# Patient Record
Sex: MALE | Race: WHITE | Employment: OTHER | URBAN - METROPOLITAN AREA
[De-identification: names, ages, dates, MRNs, and addresses within clinical notes are randomized per-mention and may not be internally consistent; named-entity substitution may affect disease eponyms.]

---

## 2017-08-19 ENCOUNTER — APPOINTMENT (OUTPATIENT)
Dept: GENERAL RADIOLOGY | Age: 42
End: 2017-08-19
Attending: PHYSICIAN ASSISTANT
Payer: COMMERCIAL

## 2017-08-19 ENCOUNTER — HOSPITAL ENCOUNTER (EMERGENCY)
Age: 42
Discharge: HOME | End: 2017-08-19
Attending: EMERGENCY MEDICINE | Admitting: EMERGENCY MEDICINE
Payer: COMMERCIAL

## 2017-08-19 VITALS
WEIGHT: 279 LBS | RESPIRATION RATE: 16 BRPM | TEMPERATURE: 98.5 F | DIASTOLIC BLOOD PRESSURE: 96 MMHG | SYSTOLIC BLOOD PRESSURE: 174 MMHG | HEART RATE: 76 BPM | OXYGEN SATURATION: 96 %

## 2017-08-19 DIAGNOSIS — S93.401A SPRAIN OF RIGHT ANKLE, UNSPECIFIED LIGAMENT, INITIAL ENCOUNTER: Primary | ICD-10-CM

## 2017-08-19 PROCEDURE — 99283 EMERGENCY DEPT VISIT LOW MDM: CPT

## 2017-08-19 PROCEDURE — L1930 AFO PLASTIC: HCPCS

## 2017-08-19 PROCEDURE — 73610 X-RAY EXAM OF ANKLE: CPT

## 2017-08-19 NOTE — DISCHARGE INSTRUCTIONS
Ankle Sprain: Care Instructions  Your Care Instructions    An ankle sprain can happen when you twist your ankle. The ligaments that support the ankle can get stretched and torn. Often the ankle is swollen and painful. Ankle sprains may take from several weeks to several months to heal. Usually, the more pain and swelling you have, the more severe your ankle sprain is and the longer it will take to heal. You can heal faster and regain strength in your ankle with good home treatment. It is very important to give your ankle time to heal completely, so that you do not easily hurt your ankle again. Follow-up care is a key part of your treatment and safety. Be sure to make and go to all appointments, and call your doctor if you are having problems. It's also a good idea to know your test results and keep a list of the medicines you take. How can you care for yourself at home? · Prop up your foot on pillows as much as possible for the next 3 days. Try to keep your ankle above the level of your heart. This will help reduce the swelling. · Follow your doctor's directions for wearing a splint or elastic bandage. Wrapping the ankle may help reduce or prevent swelling. · Your doctor may give you a splint, a brace, an air stirrup, or another form of ankle support to protect your ankle until it is healed. Wear it as directed while your ankle is healing. Do not remove it unless your doctor tells you to. After your ankle has healed, ask your doctor whether you should wear the brace when you exercise. · Put ice or cold packs on your injured ankle for 10 to 20 minutes at a time. Try to do this every 1 to 2 hours for the next 3 days (when you are awake) or until the swelling goes down. Put a thin cloth between the ice and your skin. · You may need to use crutches until you can walk without pain. If you do use crutches, try to bear some weight on your injured ankle if you can do so without pain.  This helps the ankle heal.  · Take pain medicines exactly as directed. ¨ If the doctor gave you a prescription medicine for pain, take it as prescribed. ¨ If you are not taking a prescription pain medicine, ask your doctor if you can take an over-the-counter medicine. · If you have been given ankle exercises to do at home, do them exactly as instructed. These can promote healing and help prevent lasting weakness. When should you call for help? Call your doctor now or seek immediate medical care if:  · Your pain is getting worse. · Your swelling is getting worse. · Your splint feels too tight or you are unable to loosen it. Watch closely for changes in your health, and be sure to contact your doctor if:  · You are not getting better after 1 week. Where can you learn more? Go to http://torito-kiki.info/. Enter W608 in the search box to learn more about \"Ankle Sprain: Care Instructions. \"  Current as of: March 21, 2017  Content Version: 11.3  © 5421-4449 [employer]. Care instructions adapted under license by [employer] (which disclaims liability or warranty for this information). If you have questions about a medical condition or this instruction, always ask your healthcare professional. [place] [address] any warranty or liability for your use of this information.

## 2017-08-19 NOTE — ED NOTES
NAYAN Winters has reviewed discharge instructions with the patient. The patient verbalized understanding. Pt. A&Ox4, respirations even and unlabored. VS stable as noted in flowsheet. Wheelchair assist from department with paperwork in hand.

## 2017-08-19 NOTE — ED PROVIDER NOTES
HPI Comments: Diamond Mckeon is a 37 y.o. Male who presents ambulatory to the ED with c/o right foot and ankle pain s/p falling into a hole yesterday. He states he stepped down out of his truck and landed in a hole and felt his ankle \"roll\". Pt notes secondary swelling and exacerbation of the pain with movement and pressure. Pt denies taking any medications for alleviation of symptoms. He specifically denies any fevers, chills, nausea, vomiting, chest pain, shortness of breath, headache, rash, diarrhea, sweating or weight loss. PCP: Gretchen Mata MD    Social history significant for: - Tobacco, - EtOH, - Illicit Drug Use  PMHx: none  There are no other complaints, changes, or physical findings at this time. The history is provided by the patient. No  was used. No past medical history on file. No past surgical history on file. No family history on file. Social History     Social History    Marital status: LEGALLY      Spouse name: N/A    Number of children: N/A    Years of education: N/A     Occupational History    Not on file. Social History Main Topics    Smoking status: Not on file    Smokeless tobacco: Not on file    Alcohol use Not on file    Drug use: Not on file    Sexual activity: Not on file     Other Topics Concern    Not on file     Social History Narrative         ALLERGIES: Review of patient's allergies indicates no known allergies. Review of Systems   Constitutional: Negative. Negative for activity change, appetite change, chills, fatigue, fever and unexpected weight change. HENT: Negative. Negative for congestion, hearing loss, rhinorrhea, sneezing and voice change. Eyes: Negative. Negative for pain and visual disturbance. Respiratory: Negative. Negative for apnea, cough, choking, chest tightness and shortness of breath. Cardiovascular: Negative. Negative for chest pain and palpitations.    Gastrointestinal: Negative. Negative for abdominal distention, abdominal pain, blood in stool, diarrhea, nausea and vomiting. Genitourinary: Negative. Negative for difficulty urinating, flank pain, frequency and urgency. No discharge   Musculoskeletal: Positive for myalgias (R foot ). Negative for arthralgias, back pain and neck stiffness. Skin: Negative. Negative for color change and rash. Neurological: Negative. Negative for dizziness, seizures, syncope, speech difficulty, weakness, numbness and headaches. Hematological: Negative for adenopathy. Psychiatric/Behavioral: Negative. Negative for agitation, behavioral problems, dysphoric mood and suicidal ideas. The patient is not nervous/anxious. Vitals:    08/19/17 1124   BP: (!) 174/96   Pulse: 76   Resp: 16   Temp: 98.5 °F (36.9 °C)   SpO2: 96%   Weight: 126.6 kg (279 lb)            Physical Exam   Constitutional: He is oriented to person, place, and time. He appears well-developed and well-nourished. No distress. HENT:   Head: Normocephalic and atraumatic. Mouth/Throat: Oropharynx is clear and moist. No oropharyngeal exudate. Eyes: Conjunctivae and EOM are normal. Pupils are equal, round, and reactive to light. Right eye exhibits no discharge. Left eye exhibits no discharge. Neck: Normal range of motion. Neck supple. Cardiovascular: Normal rate, regular rhythm and intact distal pulses. Exam reveals no gallop and no friction rub. No murmur heard. Pulmonary/Chest: Effort normal and breath sounds normal. No respiratory distress. He has no wheezes. He has no rales. He exhibits no tenderness. Abdominal: Soft. Bowel sounds are normal. He exhibits no distension and no mass. There is no tenderness. There is no rebound and no guarding. Musculoskeletal: Normal range of motion. He exhibits no edema. R foot- swollen and tender, decreased active/ passive   Lymphadenopathy:     He has no cervical adenopathy.    Neurological: He is alert and oriented to person, place, and time. No cranial nerve deficit. Coordination normal.   Intact neurovascularly    Skin: Skin is warm and dry. No rash noted. No erythema. Psychiatric: He has a normal mood and affect. Nursing note and vitals reviewed. MDM  Number of Diagnoses or Management Options  Diagnosis management comments: DDx: strain, sprain, fx        Amount and/or Complexity of Data Reviewed  Tests in the radiology section of CPT®: reviewed and ordered  Review and summarize past medical records: yes    Patient Progress  Patient progress: stable    ED Course       Procedures    LABORATORY TESTS:  No results found for this or any previous visit (from the past 12 hour(s)). IMAGING RESULTS:  XR ANKLE RT MIN 3 V   Final Result   EXAM:  XR ANKLE RT MIN 3 V     INDICATION:  Right ankle pain and swelling from trauma yesterday.     COMPARISON: None.     FINDINGS: Three views of the right ankle demonstrate no fracture or dislocation. There is mild osteoarthritis. There are small plantar and dorsal calcaneal  spurs.     IMPRESSION  IMPRESSION: No fracture. MEDICATIONS GIVEN:  Medications - No data to display    IMPRESSION:  1. Sprain of right ankle, unspecified ligament, initial encounter        PLAN:  1. There are no discharge medications for this patient. 2.   Follow-up Information     Follow up With Details Comments Contact Info    Bryan Broussard MD In 2 days As needed [address]  [phone]      Osbaldo Tirado. Marty Ellis MD In 2 days As needed [address] [facility]  [address]  [facility].  [phone]          Return to ED if worse     Discharge Note:  12:37 PM  The pt is ready for discharge. The pt's signs, symptoms, diagnosis, and discharge instructions have been discussed and pt has conveyed their understanding. The pt is to follow up as recommended or return to ER should their symptoms worsen.  Plan has been discussed and pt is in agreement. This note is prepared by Guero Arreaga, acting as a Scribe for [facility] [employer]. [facility] [employer]: The scribe's documentation has been prepared under my direction and personally reviewed by me in its entirety. I confirm that the notes above accurately reflects all work, treatment, procedures, and medical decision making performed by me.

## 2018-08-28 ENCOUNTER — APPOINTMENT (OUTPATIENT)
Dept: GENERAL RADIOLOGY | Age: 43
End: 2018-08-28
Attending: EMERGENCY MEDICINE
Payer: SELF-PAY

## 2018-08-28 ENCOUNTER — HOSPITAL ENCOUNTER (EMERGENCY)
Age: 43
Discharge: HOME | End: 2018-08-28
Attending: EMERGENCY MEDICINE | Admitting: EMERGENCY MEDICINE
Payer: SELF-PAY

## 2018-08-28 VITALS
BODY MASS INDEX: 38.47 KG/M2 | DIASTOLIC BLOOD PRESSURE: 60 MMHG | RESPIRATION RATE: 19 BRPM | HEIGHT: 70 IN | OXYGEN SATURATION: 98 % | HEART RATE: 85 BPM | WEIGHT: 268.74 LBS | TEMPERATURE: 98.7 F | SYSTOLIC BLOOD PRESSURE: 112 MMHG

## 2018-08-28 DIAGNOSIS — R07.89 ATYPICAL CHEST PAIN: Primary | ICD-10-CM

## 2018-08-28 LAB
ALBUMIN SERPL-MCNC: 4.3 G/DL (ref 3.5–5)
ALBUMIN/GLOB SERPL: 1 {RATIO} (ref 1.1–2.2)
ALP SERPL-CCNC: 102 U/L (ref 45–117)
ALT SERPL-CCNC: 54 U/L (ref 12–78)
ANION GAP SERPL CALC-SCNC: 8 MMOL/L (ref 5–15)
AST SERPL-CCNC: 32 U/L (ref 15–37)
BASOPHILS # BLD: 0.1 K/UL (ref 0–0.1)
BASOPHILS NFR BLD: 1 % (ref 0–1)
BILIRUB SERPL-MCNC: 0.9 MG/DL (ref 0.2–1)
BUN SERPL-MCNC: 14 MG/DL (ref 6–20)
BUN/CREAT SERPL: 11 (ref 12–20)
CALCIUM SERPL-MCNC: 9.4 MG/DL (ref 8.5–10.1)
CHLORIDE SERPL-SCNC: 100 MMOL/L (ref 97–108)
CO2 SERPL-SCNC: 28 MMOL/L (ref 21–32)
CREAT SERPL-MCNC: 1.25 MG/DL (ref 0.7–1.3)
DIFFERENTIAL METHOD BLD: ABNORMAL
EOSINOPHIL # BLD: 0.2 K/UL (ref 0–0.4)
EOSINOPHIL NFR BLD: 2 % (ref 0–7)
ERYTHROCYTE [DISTWIDTH] IN BLOOD BY AUTOMATED COUNT: 13.2 % (ref 11.5–14.5)
GLOBULIN SER CALC-MCNC: 4.2 G/DL (ref 2–4)
GLUCOSE SERPL-MCNC: 222 MG/DL (ref 65–100)
HCT VFR BLD AUTO: 45.8 % (ref 36.6–50.3)
HGB BLD-MCNC: 16 G/DL (ref 12.1–17)
IMM GRANULOCYTES # BLD: 0.1 K/UL (ref 0–0.04)
IMM GRANULOCYTES NFR BLD AUTO: 1 % (ref 0–0.5)
INR PPP: 1 (ref 0.9–1.1)
LYMPHOCYTES # BLD: 3.7 K/UL (ref 0.8–3.5)
LYMPHOCYTES NFR BLD: 30 % (ref 12–49)
MAGNESIUM SERPL-MCNC: 2.2 MG/DL (ref 1.6–2.4)
MCH RBC QN AUTO: 32.3 PG (ref 26–34)
MCHC RBC AUTO-ENTMCNC: 34.9 G/DL (ref 30–36.5)
MCV RBC AUTO: 92.5 FL (ref 80–99)
MONOCYTES # BLD: 1.1 K/UL (ref 0–1)
MONOCYTES NFR BLD: 9 % (ref 5–13)
NEUTS SEG # BLD: 7.3 K/UL (ref 1.8–8)
NEUTS SEG NFR BLD: 59 % (ref 32–75)
NRBC # BLD: 0 K/UL (ref 0–0.01)
NRBC BLD-RTO: 0 PER 100 WBC
PLATELET # BLD AUTO: 356 K/UL (ref 150–400)
PMV BLD AUTO: 10.6 FL (ref 8.9–12.9)
POTASSIUM SERPL-SCNC: 4.2 MMOL/L (ref 3.5–5.1)
PROT SERPL-MCNC: 8.5 G/DL (ref 6.4–8.2)
PROTHROMBIN TIME: 9.9 SEC (ref 9–11.1)
RBC # BLD AUTO: 4.95 M/UL (ref 4.1–5.7)
SODIUM SERPL-SCNC: 136 MMOL/L (ref 136–145)
TROPONIN I SERPL-MCNC: <0.05 NG/ML
WBC # BLD AUTO: 12.5 K/UL (ref 4.1–11.1)

## 2018-08-28 PROCEDURE — 99284 EMERGENCY DEPT VISIT MOD MDM: CPT

## 2018-08-28 PROCEDURE — 36415 COLL VENOUS BLD VENIPUNCTURE: CPT | Performed by: EMERGENCY MEDICINE

## 2018-08-28 PROCEDURE — 83735 ASSAY OF MAGNESIUM: CPT | Performed by: EMERGENCY MEDICINE

## 2018-08-28 PROCEDURE — 80053 COMPREHEN METABOLIC PANEL: CPT | Performed by: EMERGENCY MEDICINE

## 2018-08-28 PROCEDURE — 85610 PROTHROMBIN TIME: CPT | Performed by: EMERGENCY MEDICINE

## 2018-08-28 PROCEDURE — 93005 ELECTROCARDIOGRAM TRACING: CPT

## 2018-08-28 PROCEDURE — 84484 ASSAY OF TROPONIN QUANT: CPT | Performed by: EMERGENCY MEDICINE

## 2018-08-28 PROCEDURE — 85025 COMPLETE CBC W/AUTO DIFF WBC: CPT | Performed by: EMERGENCY MEDICINE

## 2018-08-28 PROCEDURE — 74011250637 HC RX REV CODE- 250/637: Performed by: EMERGENCY MEDICINE

## 2018-08-28 PROCEDURE — 71045 X-RAY EXAM CHEST 1 VIEW: CPT

## 2018-08-28 RX ORDER — NAPROXEN 500 MG/1
500 TABLET ORAL
Qty: 20 TAB | Refills: 0 | Status: SHIPPED | OUTPATIENT
Start: 2018-08-28

## 2018-08-28 RX ORDER — ASPIRIN 325 MG
325 TABLET ORAL
Status: COMPLETED | OUTPATIENT
Start: 2018-08-28 | End: 2018-08-28

## 2018-08-28 RX ORDER — ASPIRIN 325 MG
325 TABLET ORAL DAILY
Status: DISCONTINUED | OUTPATIENT
Start: 2018-08-29 | End: 2018-08-28

## 2018-08-28 RX ADMIN — ASPIRIN 325 MG ORAL TABLET 325 MG: 325 PILL ORAL at 19:37

## 2018-08-28 NOTE — ED NOTES
Patient complain of mid sternal chest pain at this time. MD Piedad Siegel made aware. Repeat EKG to be done at this time

## 2018-08-28 NOTE — ED PROVIDER NOTES
EMERGENCY DEPARTMENT HISTORY AND PHYSICAL EXAM 
 
 
Date: 2018 Patient Name: Anthony Schilling History of Presenting Illness Chief Complaint Patient presents with  Chest Pain  
  pt arrived to triage ambulatory with complaint of mid sternal chest pressure starting yesterday evening, no cardiac hx, diaphoretic, deines fever or chills  Fatigue History Provided By: Patient HPI: Anthony Schilling, 40 y.o. male with no known PMHx, presents ambulatory to the ED with cc of intermittent substernal CP since yesterday evening. Patient describes pain as \"a cinderblock sitting on my chest\" that lasts for 5-6 minutes at a time and radiates to his back. He also reports sweating and SOB last night and that today at work he was sweating with no exertion. He states he does not know of any factors that cause onset of his pain. He specifically denies any fevers, chills, nausea, vomiting, headache, rash, diarrhea, sweating or weight loss. He states he has not taken any medication today for his sxs. There are no other complaints, changes, or physical findings at this time. PCP: None Past History Past Medical History: No past medical history on file. Past Surgical History: No past surgical history on file. Family History: No family history on file. Social History: 
Social History Substance Use Topics  Smoking status: Not on file  Smokeless tobacco: Not on file  Alcohol use Not on file Allergies: 
No Known Allergies Review of Systems Review of Systems Constitutional: Positive for diaphoresis. Negative for activity change, appetite change, chills, fatigue, fever and unexpected weight change. HENT: Negative. Negative for congestion, hearing loss, rhinorrhea, sneezing and voice change. Eyes: Negative. Negative for pain and visual disturbance. Respiratory: Positive for shortness of breath.  Negative for apnea, cough, choking and chest tightness. Cardiovascular: Positive for chest pain. Negative for palpitations. Gastrointestinal: Negative. Negative for abdominal distention, abdominal pain, blood in stool, diarrhea, nausea and vomiting. Genitourinary: Negative. Negative for difficulty urinating, flank pain, frequency and urgency. No discharge Musculoskeletal: Negative. Negative for arthralgias, back pain, myalgias and neck stiffness. Skin: Negative. Negative for color change and rash. Neurological: Negative. Negative for dizziness, seizures, syncope, speech difficulty, weakness, numbness and headaches. Hematological: Negative for adenopathy. Psychiatric/Behavioral: Negative. Negative for agitation, behavioral problems, dysphoric mood and suicidal ideas. The patient is not nervous/anxious. Physical Exam  
Physical Exam  
Constitutional: He is oriented to person, place, and time. He appears well-developed and well-nourished. No distress. HENT:  
Head: Normocephalic and atraumatic. Mouth/Throat: Oropharynx is clear and moist. No oropharyngeal exudate. Eyes: Conjunctivae and EOM are normal. Pupils are equal, round, and reactive to light. Right eye exhibits no discharge. Left eye exhibits no discharge. Neck: Normal range of motion. Neck supple. Cardiovascular: Normal rate, regular rhythm and intact distal pulses. Exam reveals no gallop and no friction rub. No murmur heard. Pulmonary/Chest: Effort normal and breath sounds normal. No respiratory distress. He has no wheezes. He has no rales. He exhibits no tenderness. Abdominal: Soft. Bowel sounds are normal. He exhibits no distension and no mass. There is no tenderness. There is no rebound and no guarding. Musculoskeletal: Normal range of motion. He exhibits no edema. Lymphadenopathy:  
  He has no cervical adenopathy. Neurological: He is alert and oriented to person, place, and time.  No cranial nerve deficit. Coordination normal.  
Skin: Skin is warm and dry. No rash noted. No erythema. Psychiatric: He has a normal mood and affect. Nursing note and vitals reviewed. Diagnostic Study Results Labs - Recent Results (from the past 12 hour(s)) EKG, 12 LEAD, INITIAL Collection Time: 08/28/18  6:05 PM  
Result Value Ref Range Ventricular Rate 85 BPM  
 Atrial Rate 85 BPM  
 P-R Interval 152 ms QRS Duration 90 ms Q-T Interval 358 ms QTC Calculation (Bezet) 426 ms Calculated P Axis 53 degrees Calculated R Axis 17 degrees Calculated T Axis 36 degrees Diagnosis Normal sinus rhythm Normal ECG No previous ECGs available CBC WITH AUTOMATED DIFF Collection Time: 08/28/18  7:21 PM  
Result Value Ref Range WBC 12.5 (H) 4.1 - 11.1 K/uL  
 RBC 4.95 4.10 - 5.70 M/uL  
 HGB 16.0 12.1 - 17.0 g/dL HCT 45.8 36.6 - 50.3 % MCV 92.5 80.0 - 99.0 FL  
 MCH 32.3 26.0 - 34.0 PG  
 MCHC 34.9 30.0 - 36.5 g/dL  
 RDW 13.2 11.5 - 14.5 % PLATELET 960 161 - 002 K/uL MPV 10.6 8.9 - 12.9 FL  
 NRBC 0.0 0  WBC ABSOLUTE NRBC 0.00 0.00 - 0.01 K/uL NEUTROPHILS 59 32 - 75 % LYMPHOCYTES 30 12 - 49 % MONOCYTES 9 5 - 13 % EOSINOPHILS 2 0 - 7 % BASOPHILS 1 0 - 1 % IMMATURE GRANULOCYTES 1 (H) 0.0 - 0.5 % ABS. NEUTROPHILS 7.3 1.8 - 8.0 K/UL  
 ABS. LYMPHOCYTES 3.7 (H) 0.8 - 3.5 K/UL  
 ABS. MONOCYTES 1.1 (H) 0.0 - 1.0 K/UL  
 ABS. EOSINOPHILS 0.2 0.0 - 0.4 K/UL  
 ABS. BASOPHILS 0.1 0.0 - 0.1 K/UL  
 ABS. IMM. GRANS. 0.1 (H) 0.00 - 0.04 K/UL  
 DF AUTOMATED METABOLIC PANEL, COMPREHENSIVE Collection Time: 08/28/18  7:21 PM  
Result Value Ref Range Sodium 136 136 - 145 mmol/L Potassium 4.2 3.5 - 5.1 mmol/L Chloride 100 97 - 108 mmol/L  
 CO2 28 21 - 32 mmol/L Anion gap 8 5 - 15 mmol/L Glucose 222 (H) 65 - 100 mg/dL BUN 14 6 - 20 MG/DL  Creatinine 1.25 0.70 - 1.30 MG/DL  
 BUN/Creatinine ratio 11 (L) 12 - 20    
 GFR est AA >60 >60 ml/min/1.73m2 GFR est non-AA >60 >60 ml/min/1.73m2 Calcium 9.4 8.5 - 10.1 MG/DL Bilirubin, total 0.9 0.2 - 1.0 MG/DL  
 ALT (SGPT) 54 12 - 78 U/L  
 AST (SGOT) 32 15 - 37 U/L Alk. phosphatase 102 45 - 117 U/L Protein, total 8.5 (H) 6.4 - 8.2 g/dL Albumin 4.3 3.5 - 5.0 g/dL Globulin 4.2 (H) 2.0 - 4.0 g/dL A-G Ratio 1.0 (L) 1.1 - 2.2 MAGNESIUM Collection Time: 08/28/18  7:21 PM  
Result Value Ref Range Magnesium 2.2 1.6 - 2.4 mg/dL TROPONIN I Collection Time: 08/28/18  7:21 PM  
Result Value Ref Range Troponin-I, Qt. <0.05 <0.05 ng/mL EKG, 12 LEAD, INITIAL Collection Time: 08/28/18  7:32 PM  
Result Value Ref Range Ventricular Rate 80 BPM  
 Atrial Rate 80 BPM  
 P-R Interval 168 ms QRS Duration 90 ms Q-T Interval 374 ms QTC Calculation (Bezet) 431 ms Calculated P Axis 62 degrees Calculated R Axis 15 degrees Calculated T Axis 26 degrees Diagnosis Normal sinus rhythm Normal ECG When compared with ECG of 28-AUG-2018 18:05, 
MANUAL COMPARISON REQUIRED, DATA IS UNCONFIRMED 
  
EKG, 12 LEAD, INITIAL Collection Time: 08/28/18  7:42 PM  
Result Value Ref Range Ventricular Rate 72 BPM  
 Atrial Rate 72 BPM  
 P-R Interval 166 ms  
 QRS Duration 90 ms Q-T Interval 382 ms QTC Calculation (Bezet) 418 ms Calculated P Axis 43 degrees Calculated R Axis 15 degrees Calculated T Axis 19 degrees Diagnosis Normal sinus rhythm Normal ECG When compared with ECG of 28-AUG-2018 19:32, 
MANUAL COMPARISON REQUIRED, DATA IS UNCONFIRMED PROTHROMBIN TIME + INR Collection Time: 08/28/18  8:14 PM  
Result Value Ref Range INR 1.0 0.9 - 1.1 Prothrombin time 9.9 9.0 - 11.1 sec Radiologic Studies -  
XR CHEST PORT Final Result CT Results  (Last 48 hours) None CXR Results  (Last 48 hours) 08/28/18 1922  XR CHEST PORT Final result Impression:  Impression: 1. No acute disease Narrative:  INDICATION:  Chest Pain Exam: Portable chest 1914. Comparison: None. Findings: Cardiomediastinal silhouette is within normal limits. Pulmonary  
vasculature is not engorged. There are no focal parenchymal opacities,  
effusions, or pneumothorax. Medical Decision Making I am the first provider for this patient. I reviewed the vital signs, available nursing notes, past medical history, past surgical history, family history and social history. Vital Signs-Reviewed the patient's vital signs. Patient Vitals for the past 12 hrs: 
 Temp Pulse Resp BP SpO2  
08/28/18 2003 - 77 22 (!) 145/93 98 % 08/28/18 1915 - 77 20 (!) 143/94 98 % 08/28/18 1804 98.7 °F (37.1 °C) 91 16 (!) 143/98 98 % Pulse Oximetry Analysis - 98% on RA Cardiac Monitor:  
Rate: 91 bpm 
Rhythm: Normal Sinus Rhythm EKG interpretation: (Preliminary) 1942 Rhythm: normal sinus rhythm; and regular . Rate (approx.): 72; Axis: normal; IA interval: normal; QRS interval: normal ; ST/T wave: normal; Other findings: normal. 
Written by Rubi Alatorre ED Scribe, as dictated by Daiana Gould. Deo Stein MD. Records Reviewed: Nursing Notes and Old Medical Records Provider Notes (Medical Decision Making): DDx: ACS, arrhythmia, musculoskeletal  
 
ED Course:  
Initial assessment performed. The patients presenting problems have been discussed, and they are in agreement with the care plan formulated and outlined with them. I have encouraged them to ask questions as they arise throughout their visit. 9:14 PM 
The patient states that their symptoms have resolved and they feel much better. There are no other new complaints at this time. His questions have been answered. We are awaiting final results and those will be reviewed with them when they become available.  
The patient states his pain was worse when he sits up and stretches his chest. 
 
 
 Critical Care Time:  
0 minutes Disposition: 
9:15 PM 
Marcella Bah III's  results have been reviewed with him. He has been counseled regarding his diagnosis. He verbally conveys understanding and agreement of the signs, symptoms, diagnosis, treatment and prognosis and additionally agrees to follow up as recommended with Dr. None in 24 - 48 hours. He also agrees with the care-plan and conveys that all of his questions have been answered. I have also put together some discharge instructions for him that include: 1) educational information regarding their diagnosis, 2) how to care for their diagnosis at home, as well a 3) list of reasons why they would want to return to the ED prior to their follow-up appointment, should their condition change. PLAN: 
1. Current Discharge Medication List  
  
START taking these medications Details  
naproxen (NAPROSYN) 500 mg tablet Take 1 Tab by mouth every twelve (12) hours as needed for Pain. Qty: 20 Tab, Refills: 0  
  
  
 
2. Follow-up Information Follow up With Details Comments Contact Info Aubrie Ibanez MD Call in 2 days  [address] [facility] 
[phone] Return to ED if worse Diagnosis Clinical Impression: 1. Atypical chest pain Attestations: This note is prepared by Judie Galvan, acting as Scribe for [employer] Piyush Mooney, [address] Piyush Mooney MD: The scribe's documentation has been prepared under my direction and personally reviewed by me in its entirety. I confirm that the note above accurately reflects all work, treatment, procedures, and medical decision making performed by me.

## 2018-08-29 LAB
ATRIAL RATE: 72 BPM
ATRIAL RATE: 80 BPM
ATRIAL RATE: 85 BPM
CALCULATED P AXIS, ECG09: 43 DEGREES
CALCULATED P AXIS, ECG09: 53 DEGREES
CALCULATED P AXIS, ECG09: 62 DEGREES
CALCULATED R AXIS, ECG10: 15 DEGREES
CALCULATED R AXIS, ECG10: 15 DEGREES
CALCULATED R AXIS, ECG10: 17 DEGREES
CALCULATED T AXIS, ECG11: 19 DEGREES
CALCULATED T AXIS, ECG11: 26 DEGREES
CALCULATED T AXIS, ECG11: 36 DEGREES
DIAGNOSIS, 93000: NORMAL
P-R INTERVAL, ECG05: 152 MS
P-R INTERVAL, ECG05: 166 MS
P-R INTERVAL, ECG05: 168 MS
Q-T INTERVAL, ECG07: 358 MS
Q-T INTERVAL, ECG07: 374 MS
Q-T INTERVAL, ECG07: 382 MS
QRS DURATION, ECG06: 90 MS
QTC CALCULATION (BEZET), ECG08: 418 MS
QTC CALCULATION (BEZET), ECG08: 426 MS
QTC CALCULATION (BEZET), ECG08: 431 MS
VENTRICULAR RATE, ECG03: 72 BPM
VENTRICULAR RATE, ECG03: 80 BPM
VENTRICULAR RATE, ECG03: 85 BPM

## 2018-08-29 NOTE — DISCHARGE INSTRUCTIONS
Musculoskeletal Chest Pain: Care Instructions  Your Care Instructions    Chest pain is not always a sign that something is wrong with your heart or that you have another serious problem. The doctor thinks your chest pain is caused by strained muscles or ligaments, inflamed chest cartilage, or another problem in your chest, rather than by your heart. You may need more tests to find the cause of your chest pain. Follow-up care is a key part of your treatment and safety. Be sure to make and go to all appointments, and call your doctor if you are having problems. It's also a good idea to know your test results and keep a list of the medicines you take. How can you care for yourself at home? · Take pain medicines exactly as directed. ¨ If the doctor gave you a prescription medicine for pain, take it as prescribed. ¨ If you are not taking a prescription pain medicine, ask your doctor if you can take an over-the-counter medicine. · Rest and protect the sore area. · Stop, change, or take a break from any activity that may be causing your pain or soreness. · Put ice or a cold pack on the sore area for 10 to 20 minutes at a time. Try to do this every 1 to 2 hours for the next 3 days (when you are awake) or until the swelling goes down. Put a thin cloth between the ice and your skin. · After 2 or 3 days, apply a heating pad set on low or a warm cloth to the area that hurts. Some doctors suggest that you go back and forth between hot and cold. · Do not wrap or tape your ribs for support. This may cause you to take smaller breaths, which could increase your risk of lung problems. · Mentholated creams such as Bengay or Icy Hot may soothe sore muscles. Follow the instructions on the package. · Follow your doctor's instructions for exercising. · Gentle stretching and massage may help you get better faster. Stretch slowly to the point just before pain begins, and hold the stretch for at least 15 to 30 seconds.  Do this 3 or 4 times a day. Stretch just after you have applied heat. · As your pain gets better, slowly return to your normal activities. Any increased pain may be a sign that you need to rest a while longer. When should you call for help? Call 911 anytime you think you may need emergency care. For example, call if:    · You have chest pain or pressure. This may occur with:  ¨ Sweating. ¨ Shortness of breath. ¨ Nausea or vomiting. ¨ Pain that spreads from the chest to the neck, jaw, or one or both shoulders or arms. ¨ Dizziness or lightheadedness. ¨ A fast or uneven pulse. After calling 911, chew 1 adult-strength aspirin. Wait for an ambulance. Do not try to drive yourself.     · You have sudden chest pain and shortness of breath, or you cough up blood.    Call your doctor now or seek immediate medical care if:    · You have any trouble breathing.     · Your chest pain gets worse.     · Your chest pain occurs consistently with exercise and is relieved by rest.    Watch closely for changes in your health, and be sure to contact your doctor if:    · Your chest pain does not get better after 1 week. Where can you learn more? Go to http://torito-kiki.info/. Enter V293 in the search box to learn more about \"Musculoskeletal Chest Pain: Care Instructions. \"  Current as of: November 20, 2017  Content Version: 11.7  © 7563-8955 [employer]. Care instructions adapted under license by [employer] (which disclaims liability or warranty for this information). If you have questions about a medical condition or this instruction, always ask your healthcare professional. [place] [address] any warranty or liability for your use of this information.

## 2018-08-29 NOTE — ED NOTES
MD Geovanna Valladares reviewed discharge instructions with the patient. Patient ambulatory out of treatment area with steady gait

## 2018-09-06 ENCOUNTER — OFFICE VISIT (OUTPATIENT)
Dept: CARDIOLOGY CLINIC | Age: 43
End: 2018-09-06

## 2018-09-06 VITALS
SYSTOLIC BLOOD PRESSURE: 118 MMHG | HEART RATE: 79 BPM | RESPIRATION RATE: 18 BRPM | DIASTOLIC BLOOD PRESSURE: 80 MMHG | BODY MASS INDEX: 39.61 KG/M2 | WEIGHT: 276.7 LBS | HEIGHT: 70 IN | OXYGEN SATURATION: 96 %

## 2018-09-06 DIAGNOSIS — I20.9 ANGINA PECTORIS (HCC): Primary | ICD-10-CM

## 2018-09-06 DIAGNOSIS — F17.201 MODERATE TOBACCO USE DISORDER, IN SUSTAINED REMISSION: ICD-10-CM

## 2018-09-06 DIAGNOSIS — R07.9 CHEST PAIN, UNSPECIFIED TYPE: ICD-10-CM

## 2018-09-06 PROBLEM — E66.01 SEVERE OBESITY (BMI 35.0-39.9): Status: ACTIVE | Noted: 2018-09-06

## 2018-09-06 RX ORDER — METOPROLOL TARTRATE 25 MG/1
25 TABLET, FILM COATED ORAL 2 TIMES DAILY
Qty: 60 TAB | Refills: 6 | Status: SHIPPED | OUTPATIENT
Start: 2018-09-06 | End: 2018-09-06 | Stop reason: SDUPTHER

## 2018-09-06 RX ORDER — NITROGLYCERIN 0.4 MG/1
0.4 TABLET SUBLINGUAL
Qty: 25 TAB | Refills: 1 | Status: SHIPPED | OUTPATIENT
Start: 2018-09-06 | End: 2018-09-06 | Stop reason: SDUPTHER

## 2018-09-06 RX ORDER — ISOSORBIDE MONONITRATE 30 MG/1
30 TABLET, EXTENDED RELEASE ORAL DAILY
Qty: 90 TAB | Refills: 1 | Status: SHIPPED | OUTPATIENT
Start: 2018-09-06 | End: 2018-09-08 | Stop reason: SINTOL

## 2018-09-06 RX ORDER — ASPIRIN 81 MG/1
81 TABLET ORAL DAILY
Qty: 30 TAB | Refills: 11
Start: 2018-09-06

## 2018-09-06 RX ORDER — METOPROLOL TARTRATE 25 MG/1
25 TABLET, FILM COATED ORAL 2 TIMES DAILY
Qty: 180 TAB | Refills: 3 | Status: SHIPPED | OUTPATIENT
Start: 2018-09-06 | End: 2019-04-23 | Stop reason: SDUPTHER

## 2018-09-06 RX ORDER — RANITIDINE 150 MG/1
150 TABLET, FILM COATED ORAL 2 TIMES DAILY
Qty: 180 TAB | Refills: 0 | Status: ON HOLD | OUTPATIENT
Start: 2018-09-06 | End: 2018-09-28

## 2018-09-06 RX ORDER — NITROGLYCERIN 0.4 MG/1
0.4 TABLET SUBLINGUAL
Qty: 25 TAB | Refills: 1 | Status: SHIPPED | OUTPATIENT
Start: 2018-09-06

## 2018-09-06 RX ORDER — RANITIDINE 150 MG/1
150 TABLET, FILM COATED ORAL 2 TIMES DAILY
Qty: 180 TAB | Refills: 0
Start: 2018-09-06 | End: 2018-09-06 | Stop reason: SDUPTHER

## 2018-09-06 RX ORDER — ISOSORBIDE MONONITRATE 30 MG/1
30 TABLET, EXTENDED RELEASE ORAL DAILY
Qty: 90 TAB | Refills: 1 | Status: SHIPPED | OUTPATIENT
Start: 2018-09-06 | End: 2018-09-06 | Stop reason: SDUPTHER

## 2018-09-06 NOTE — PROGRESS NOTES
1. Have you been to the ER, urgent care clinic since your last visit? Hospitalized since your last visit? Seen in ER on 8/28/18 for chest pain. 2. Have you seen or consulted any other health care providers outside of the [facility] since your last visit? Include any pap smears or colon screening. No. 
 
 
Chief Complaint Patient presents with  New Patient  
  chest pain in middle of chest on and off that can radiate to his back,sob

## 2018-09-06 NOTE — MR AVS SNAPSHOT
[address] 
[phone] Patient: Missy Baptiste MRN: [id number] :1975 Visit Information Date & Time Provider Department Dept. Phone Encounter #  
 2018 10:30 AM Ridge Freeman, [address] [facility] [phone] Follow-up Instructions Return in about 2 weeks (around 2018). Follow-up and Disposition History Your Appointments 2018  1:00 PM  
NUCLEAR MEDICINE with NUCLEAR, [facility] [address] [facility]) Appt Note: per Dr Radha Arroyo, 5'10, 276, ECST   [address] [address] [facility] [address]  
[phone] [address] [facility] [address] [id number]  
  
    
 2018  3:30 PM  
ECHO CARDIOGRAMS 2D with ECHO, [facility] [address] [place] [address]) Appt Note: per Dr. Keyona Anderson [id number] [facility] [address]  
[phone] [id number] [facility] [address] Upcoming Health Maintenance Date Due DTaP/Tdap/Td series (1 - Tdap) 11/10/1997 Influenza Age 5 to Adult 2018 Allergies as of 2018  Review Complete On: 2018 By: Ridge Freeman MD  
 No Known Allergies Current Immunizations  Never Reviewed No immunizations on file. Not reviewed this visit You Were Diagnosed With   
  
 Codes Comments Angina pectoris ([facility] [address]    -  Primary ICD-10-CM: I20.9 ICD-9-CM: 413.9 Chest pain, unspecified type     ICD-10-CM: R07.9 ICD-9-CM: 786.50 Moderate tobacco use disorder, in sustained remission     ICD-10-CM: F17.201 ICD-9-CM: 305.1 BMI 39.0-39.9,adult     ICD-10-CM: F12.56 ICD-9-CM: V85.39 Vitals BP Pulse Resp Height(growth percentile) Weight(growth percentile) SpO2  
 118/80 (BP 1 Location: Right arm, BP Patient Position: Sitting) 79 18 5' 10\" (1.778 m) 276 lb 11.2 oz (125.5 kg) 96% BMI Smoking Status 39.7 kg/m2 Former Smoker Vitals History BMI and BSA Data Body Mass Index Body Surface Area 39.7 kg/m 2 2.49 m 2 Preferred Pharm[id number] [place] [place] - [address] [phone] Your Updated Medication List  
  
   
This list is accurate as of 18 11:21 AM.  Always use your most recent med list.  
  
  
  
  
 aspirin delayed-release 81 mg tablet Take 1 Tab by mouth daily. isosorbide mononitrate ER 30 mg tablet Commonly known as:  IMDUR Take 1 Tab by mouth daily. To help prevent chest pain- call MD if significant headache  
  
 metoprolol tartrate 25 mg tablet Commonly known as:  LOPRESSOR Take 1 Tab by mouth two (2) times a day. naproxen 500 mg tablet Commonly known as:  NAPROSYN Take 1 Tab by mouth every twelve (12) hours as needed for Pain. nitroglycerin 0.4 mg SL tablet Commonly known as:  NITROSTAT  
1 Tab by SubLINGual route every five (5) minutes as needed (call 911 if not relieved by 3). raNITIdine 150 mg tablet Commonly known as:  ZANTAC Take 1 Tab by mouth two (2) times a day. Prescriptions Sent to Pharmacy Refills  
 metoprolol tartrate (LOPRESSOR) 25 mg tablet 3 Sig: Take 1 Tab by mouth two (2) times a day. Class: Normal  
 Pharmacy: [facility] [address] [place] [address] [address] Ph #: [phone] Route: Oral  
 isosorbide mononitrate ER (IMDUR) 30 mg tablet 1 Sig: Take 1 Tab by mouth daily. To help prevent chest pain- call MD if significant headache Class: Normal  
 Pharmacy: [facility]/pharmacy [address] [place] [address] S[address] Ph #: [phone] Route: Oral  
 nitroglycerin (NITROSTAT) 0.4 mg SL tablet 1  Si Tab by SubLINGual route every five (5) minutes as needed (call 911 if not relieved by 3). Class: Normal  
 Pharmacy: [facility]acy [address] [employer] [address] Ph #: [phone] Route: SubLINGual  
 raNITIdine (ZANTAC) 150 mg tablet 0 Sig: Take 1 Tab by mouth two (2) times a day. Class: Normal  
 Pharmacy: [facility]/pharmacy [address] [employer] [address] Ph #: [phone] Route: Oral  
  
We Performed the Following AMB POC EKG ROUTINE W/ 12 LEADS, INTER & REP [75925 CPT(R)] Follow-up Instructions Return in about 2 weeks (around 9/20/2018). To-Do List   
 09/06/2018 ECHO:  2D ECHO COMPLETE ADULT (TTE) W OR WO CONTR   
  
 09/06/2018 ECG:  STRESS TEST CARDIOLITE Introducing [facility]! [employer] introduces AppSheet patient portal. Now you can access parts of your medical record, email your doctor's office, and request medication refills online. 1. In your internet browser, go to [email or web address] 2. Click on the First Time User? Click Here link in the Sign In box. You will see the New Member Sign Up page. 3. Enter your AppSheet Access Code exactly as it appears below. You will not need to use this code after youve completed the sign-up process. If you do not sign up before the expiration date, you must request a new code. · AppSheet Access Code: [email or web address] Expires: 11/26/2018  6:04 PM 
 
4. Enter the last four digits of your Social Security Number (xxxx) and Date of Birth (mm/dd/yyyy) as indicated and click Submit. You will be taken to the next sign-up page. 5. Create a AppSheet ID. This will be your AppSheet login ID and cannot be changed, so think of one that is secure and easy to remember. 6. Create a AppSheet password. You can change your password at any time. 7. Enter your Password Reset Question and Answer. This can be used at a later time if you forget your password. 8. Enter your e-mail address. You will receive e-mail notification when new information is available in [address] 9. Click Sign Up. You can now view and download portions of your medical record. 10. Click the Download Summary menu link to download a portable copy of your medical information. If you have questions, please visit the Frequently Asked Questions section of the El[employer]r website. Remember, Elixr is NOT to be used for urgent needs. For medical emergencies, dial 911. Now available from your iPhone and Android! Please provide this summary of care documentation to your next provider. Your primary care clinician is listed as NONE. If you have any questions after today's visit, please call [phone].

## 2018-09-06 NOTE — PROGRESS NOTES
[address] [place] [address] [address] [facility]  [phone] Subjective:  
  
Jenna Mon is a 40 y.o. male is here for a new patient visit for chest pain. He was recently seen in the emergency department with the following symptoms: 
 
\"46 y.o. male with no known PMHx, presents ambulatory to the ED with cc of intermittent substernal CP since yesterday evening. Patient describes pain as \"a cinderblock sitting on my chest\" that lasts for 5-6 minutes at a time and radiates to his back. He also reports sweating and SOB last night and that today at work he was sweating with no exertion. He states he does not know of any factors that cause onset of his pain. \" The patient denies shortness of breath, orthopnea, PND, LE edema, palpitations, syncope, or presyncope. Patient Active Problem List  
 Diagnosis Date Noted  Chest pain 09/06/2018  Moderate tobacco use disorder, in sustained remission 09/06/2018  Severe obesity (BMI 35.0-39.9) ([facility] [address] 09/06/2018 None No past medical history on file. No past surgical history on file. No Known Allergies Family History Problem Relation Age of Onset  Heart Disease Father 48 Social History Social History  Marital status: SINGLE Spouse name: N/A  
 Number of children: N/A  
 Years of education: N/A Occupational History  Not on file. Social History Main Topics  Smoking status: Former Smoker Types: Cigarettes Quit date: 9/6/2014  Smokeless tobacco: Current User Types: Chew  Alcohol use Yes Comment: occasionally  Drug use: No  
 Sexual activity: Not on file Other Topics Concern  Not on file Social History Narrative  No narrative on file Current Outpatient Prescriptions Medication Sig  
 aspirin delayed-release 81 mg tablet Take 1 Tab by mouth daily.  metoprolol tartrate (LOPRESSOR) 25 mg tablet Take 1 Tab by mouth two (2) times a day.  isosorbide mononitrate ER (IMDUR) 30 mg tablet Take 1 Tab by mouth daily. To help prevent chest pain- call MD if significant headache  nitroglycerin (NITROSTAT) 0.4 mg SL tablet 1 Tab by SubLINGual route every five (5) minutes as needed (call 911 if not relieved by 3).  raNITIdine (ZANTAC) 150 mg tablet Take 1 Tab by mouth two (2) times a day.  naproxen (NAPROSYN) 500 mg tablet Take 1 Tab by mouth every twelve (12) hours as needed for Pain. No current facility-administered medications for this visit. Review of Symptoms: 
11 systems reviewed, negative other than as stated in the HPI Physical ExamPhysical Exam:   
Vitals:  
 09/06/18 1037 09/06/18 1050 BP: 114/72 118/80 Pulse: 79 Resp: 18 SpO2: 96% Weight: 276 lb 11.2 oz (125.5 kg) Height: 5' 10\" (1.778 m) Body mass index is 39.7 kg/(m^2). General PE Gen:  NAD Mental Status - Alert. General Appearance - Not in acute distress. Chest and Lung Exam  
Inspection: Accessory muscles - No use of accessory muscles in breathing. Auscultation:  
Breath sounds: - Normal.  
Cardiovascular Inspection: Jugular vein - Bilateral - Inspection Normal.  
Palpation/Percussion:  
Apical Impulse: - Normal.  
Auscultation: Rhythm - Regular. Heart Sounds - S1 WNL and S2 WNL. No S3 or S4. Murmurs & Other Heart Sounds: Auscultation of the heart reveals - No Murmurs. Peripheral Vascular Upper Extremity: Inspection - Bilateral - No Cyanotic nailbeds or Digital clubbing. Lower Extremity:  
Palpation: Edema - Bilateral - No edema. Abdomen:   Soft, non-tender, bowel sounds are active. Neuro: A&O times 3, CN and motor grossly WNL Labs:  
No results found for: CHOL, CHOLX, CHLST, CHOLV, 197750, HDL, LDL, LDLC, DLDLP, TGLX, TRIGL, TRIGP, CHHD, CHHDX No results found for: CPK, CPKX, CPX Lab Results Component Value Date/Time  Sodium 136 08/28/2018 07:21 PM  
 Potassium 4.2 08/28/2018 07:21 PM  
 Chloride 100 08/28/2018 07:21 PM  
 CO2 28 08/28/2018 07:21 PM  
 Anion gap 8 08/28/2018 07:21 PM  
 Glucose 222 (H) 08/28/2018 07:21 PM  
 BUN 14 08/28/2018 07:21 PM  
 Creatinine 1.25 08/28/2018 07:21 PM  
 BUN/Creatinine ratio 11 (L) 08/28/2018 07:21 PM  
 GFR est AA >60 08/28/2018 07:21 PM  
 GFR est non-AA >60 08/28/2018 07:21 PM  
 Calcium 9.4 08/28/2018 07:21 PM  
 Bilirubin, total 0.9 08/28/2018 07:21 PM  
 AST (SGOT) 32 08/28/2018 07:21 PM  
 Alk. phosphatase 102 08/28/2018 07:21 PM  
 Protein, total 8.5 (H) 08/28/2018 07:21 PM  
 Albumin 4.3 08/28/2018 07:21 PM  
 Globulin 4.2 (H) 08/28/2018 07:21 PM  
 A-G Ratio 1.0 (L) 08/28/2018 07:21 PM  
 ALT (SGPT) 54 08/28/2018 07:21 PM  
 
 
EKG: 
NSR Assessment: 1. Angina pectoris ([address] 2. Chest pain, unspecified type 3. Moderate tobacco use disorder, in sustained remission 4. BMI 39.0-39.9,adult Orders Placed This Encounter  AMB POC EKG ROUTINE W/ 12 LEADS, INTER & REP Order Specific Question:   Reason for Exam: Answer:   routine  STRESS TEST CARDIOLITE Standing Status:   Future Standing Expiration Date:   3/8/2019 Order Specific Question:   Reason for Exam: Answer:   cp  
 2D ECHO COMPLETE ADULT (TTE) W OR WO CONTR Standing Status:   Future Standing Expiration Date:   3/6/2019 Order Specific Question:   Reason for Exam: Answer:   HF  
 aspirin delayed-release 81 mg tablet Sig: Take 1 Tab by mouth daily. Dispense:  30 Tab Refill:  11  
 DISCONTD: metoprolol tartrate (LOPRESSOR) 25 mg tablet Sig: Take 1 Tab by mouth two (2) times a day. Dispense:  60 Tab Refill:  6  
 DISCONTD: isosorbide mononitrate ER (IMDUR) 30 mg tablet Sig: Take 1 Tab by mouth daily. To help prevent chest pain- call MD if significant headache Dispense:  90 Tab Refill:  1  
 DISCONTD: nitroglycerin (NITROSTAT) 0.4 mg SL tablet Si Tab by SubLINGual route every five (5) minutes as needed (call 911 if not relieved by 3). Dispense:  25 Tab Refill:  1  
 DISCONTD: raNITIdine (ZANTAC) 150 mg tablet Sig: Take 1 Tab by mouth two (2) times a day. Dispense:  180 Tab Refill:  0  
 metoprolol tartrate (LOPRESSOR) 25 mg tablet Sig: Take 1 Tab by mouth two (2) times a day. Dispense:  180 Tab Refill:  3  
 isosorbide mononitrate ER (IMDUR) 30 mg tablet Sig: Take 1 Tab by mouth daily. To help prevent chest pain- call MD if significant headache Dispense:  90 Tab Refill:  1  
 nitroglycerin (NITROSTAT) 0.4 mg SL tablet Si Tab by SubLINGual route every five (5) minutes as needed (call 911 if not relieved by 3). Dispense:  25 Tab Refill:  1  raNITIdine (ZANTAC) 150 mg tablet Sig: Take 1 Tab by mouth two (2) times a day. Dispense:  180 Tab Refill:  0 Plan:  
 
40year-old with multiple cardiac risk factors and severe substernal chest pain at rest, concerning for angina pectoris. I will evaluate for structural heart disease and ischemia, adjust medications as noted, and follow up after tests. He will try Zantac which he has at home if he thinks his symptoms might be GERD. The patient knows to go to the emergency room if any chest pain not relieved by rest or nitroglycerin.    
 
Marily Franco MD

## 2018-09-08 ENCOUNTER — DOCUMENTATION ONLY (OUTPATIENT)
Dept: CARDIOLOGY CLINIC | Age: 43
End: 2018-09-08

## 2018-09-08 NOTE — PROGRESS NOTES
Discontinued isosorbide mononitrate due to severe headache, advised to call if recurrent chest pain requiring nitroglycerin so we can try Ranexa instead. Added intolerance to the allergies tab.

## 2018-09-11 ENCOUNTER — CLINICAL SUPPORT (OUTPATIENT)
Dept: CARDIOLOGY CLINIC | Age: 43
End: 2018-09-11

## 2018-09-11 DIAGNOSIS — R93.1 ABNORMAL NUCLEAR CARDIAC IMAGING TEST: Primary | ICD-10-CM

## 2018-09-17 ENCOUNTER — CLINICAL SUPPORT (OUTPATIENT)
Dept: CARDIOLOGY CLINIC | Age: 43
End: 2018-09-17

## 2018-09-17 DIAGNOSIS — I20.9 ANGINA PECTORIS (HCC): ICD-10-CM

## 2018-09-17 DIAGNOSIS — R07.9 CHEST PAIN, UNSPECIFIED TYPE: ICD-10-CM

## 2018-09-17 DIAGNOSIS — I20.9 ANGINA, CLASS III (HCC): Primary | ICD-10-CM

## 2018-09-17 DIAGNOSIS — I25.9 MYOCARDIAL ISCHEMIA: ICD-10-CM

## 2018-09-17 DIAGNOSIS — F17.201 MODERATE TOBACCO USE DISORDER, IN SUSTAINED REMISSION: ICD-10-CM

## 2018-09-18 ENCOUNTER — HOSPITAL ENCOUNTER (EMERGENCY)
Age: 43
Discharge: HOME | End: 2018-09-18
Attending: EMERGENCY MEDICINE | Admitting: EMERGENCY MEDICINE
Payer: SELF-PAY

## 2018-09-18 VITALS
DIASTOLIC BLOOD PRESSURE: 73 MMHG | OXYGEN SATURATION: 95 % | WEIGHT: 270.95 LBS | TEMPERATURE: 98.4 F | RESPIRATION RATE: 16 BRPM | HEIGHT: 70 IN | HEART RATE: 85 BPM | SYSTOLIC BLOOD PRESSURE: 136 MMHG | BODY MASS INDEX: 38.79 KG/M2

## 2018-09-18 DIAGNOSIS — E11.9 DIABETES MELLITUS, NEW ONSET (HCC): Primary | ICD-10-CM

## 2018-09-18 LAB
ALBUMIN SERPL-MCNC: 4.1 G/DL (ref 3.5–5)
ALBUMIN/GLOB SERPL: 1 {RATIO} (ref 1.1–2.2)
ALP SERPL-CCNC: 102 U/L (ref 45–117)
ALT SERPL-CCNC: 65 U/L (ref 12–78)
ANION GAP SERPL CALC-SCNC: 7 MMOL/L (ref 5–15)
APPEARANCE UR: CLEAR
AST SERPL-CCNC: 23 U/L (ref 15–37)
BASOPHILS # BLD: 0 K/UL (ref 0–0.1)
BASOPHILS NFR BLD: 0 % (ref 0–1)
BILIRUB SERPL-MCNC: 0.6 MG/DL (ref 0.2–1)
BILIRUB UR QL: NEGATIVE
BUN SERPL-MCNC: 30 MG/DL (ref 6–20)
BUN/CREAT SERPL: 23 (ref 12–20)
CALCIUM SERPL-MCNC: 9.5 MG/DL (ref 8.5–10.1)
CHLORIDE SERPL-SCNC: 91 MMOL/L (ref 97–108)
CO2 SERPL-SCNC: 31 MMOL/L (ref 21–32)
COLOR UR: ABNORMAL
CREAT SERPL-MCNC: 1.32 MG/DL (ref 0.7–1.3)
DIFFERENTIAL METHOD BLD: ABNORMAL
EOSINOPHIL # BLD: 0.2 K/UL (ref 0–0.4)
EOSINOPHIL NFR BLD: 2 % (ref 0–7)
ERYTHROCYTE [DISTWIDTH] IN BLOOD BY AUTOMATED COUNT: 12.9 % (ref 11.5–14.5)
GLOBULIN SER CALC-MCNC: 4.1 G/DL (ref 2–4)
GLUCOSE BLD STRIP.AUTO-MCNC: 301 MG/DL (ref 65–100)
GLUCOSE BLD STRIP.AUTO-MCNC: 564 MG/DL (ref 65–100)
GLUCOSE SERPL-MCNC: 479 MG/DL (ref 65–100)
GLUCOSE UR STRIP.AUTO-MCNC: >1000 MG/DL
HCT VFR BLD AUTO: 45.8 % (ref 36.6–50.3)
HGB BLD-MCNC: 15.9 G/DL (ref 12.1–17)
HGB UR QL STRIP: NEGATIVE
IMM GRANULOCYTES # BLD: 0.1 K/UL (ref 0–0.04)
IMM GRANULOCYTES NFR BLD AUTO: 1 % (ref 0–0.5)
KETONES UR QL STRIP.AUTO: NEGATIVE MG/DL
LEUKOCYTE ESTERASE UR QL STRIP.AUTO: NEGATIVE
LIPASE SERPL-CCNC: 412 U/L (ref 73–393)
LYMPHOCYTES # BLD: 2.8 K/UL (ref 0.8–3.5)
LYMPHOCYTES NFR BLD: 26 % (ref 12–49)
MCH RBC QN AUTO: 32 PG (ref 26–34)
MCHC RBC AUTO-ENTMCNC: 34.7 G/DL (ref 30–36.5)
MCV RBC AUTO: 92.2 FL (ref 80–99)
MONOCYTES # BLD: 0.9 K/UL (ref 0–1)
MONOCYTES NFR BLD: 8 % (ref 5–13)
NEUTS SEG # BLD: 6.7 K/UL (ref 1.8–8)
NEUTS SEG NFR BLD: 63 % (ref 32–75)
NITRITE UR QL STRIP.AUTO: NEGATIVE
NRBC # BLD: 0 K/UL (ref 0–0.01)
NRBC BLD-RTO: 0 PER 100 WBC
PH UR STRIP: 5 [PH] (ref 5–8)
PLATELET # BLD AUTO: 367 K/UL (ref 150–400)
PMV BLD AUTO: 10.7 FL (ref 8.9–12.9)
POTASSIUM SERPL-SCNC: 4.4 MMOL/L (ref 3.5–5.1)
PROT SERPL-MCNC: 8.2 G/DL (ref 6.4–8.2)
PROT UR STRIP-MCNC: NEGATIVE MG/DL
RBC # BLD AUTO: 4.97 M/UL (ref 4.1–5.7)
SERVICE CMNT-IMP: ABNORMAL
SERVICE CMNT-IMP: ABNORMAL
SODIUM SERPL-SCNC: 129 MMOL/L (ref 136–145)
SP GR UR REFRACTOMETRY: >1.03 (ref 1–1.03)
UROBILINOGEN UR QL STRIP.AUTO: 0.2 EU/DL (ref 0.2–1)
WBC # BLD AUTO: 10.5 K/UL (ref 4.1–11.1)

## 2018-09-18 PROCEDURE — 99285 EMERGENCY DEPT VISIT HI MDM: CPT

## 2018-09-18 PROCEDURE — 80053 COMPREHEN METABOLIC PANEL: CPT | Performed by: EMERGENCY MEDICINE

## 2018-09-18 PROCEDURE — 74011250636 HC RX REV CODE- 250/636: Performed by: EMERGENCY MEDICINE

## 2018-09-18 PROCEDURE — 96374 THER/PROPH/DIAG INJ IV PUSH: CPT

## 2018-09-18 PROCEDURE — 96361 HYDRATE IV INFUSION ADD-ON: CPT

## 2018-09-18 PROCEDURE — 83690 ASSAY OF LIPASE: CPT | Performed by: EMERGENCY MEDICINE

## 2018-09-18 PROCEDURE — 85025 COMPLETE CBC W/AUTO DIFF WBC: CPT | Performed by: EMERGENCY MEDICINE

## 2018-09-18 PROCEDURE — 82962 GLUCOSE BLOOD TEST: CPT

## 2018-09-18 PROCEDURE — 81003 URINALYSIS AUTO W/O SCOPE: CPT | Performed by: EMERGENCY MEDICINE

## 2018-09-18 PROCEDURE — 36415 COLL VENOUS BLD VENIPUNCTURE: CPT | Performed by: EMERGENCY MEDICINE

## 2018-09-18 PROCEDURE — 74011636637 HC RX REV CODE- 636/637: Performed by: EMERGENCY MEDICINE

## 2018-09-18 RX ORDER — METFORMIN HYDROCHLORIDE 500 MG/1
500 TABLET ORAL 2 TIMES DAILY WITH MEALS
Qty: 60 TAB | Refills: 0 | Status: ON HOLD | OUTPATIENT
Start: 2018-09-18 | End: 2018-09-28

## 2018-09-18 RX ORDER — INSULIN PUMP SYRINGE, 3 ML
EACH MISCELLANEOUS
Qty: 1 KIT | Refills: 0 | Status: SHIPPED | OUTPATIENT
Start: 2018-09-18

## 2018-09-18 RX ADMIN — INSULIN HUMAN 10 UNITS: 100 INJECTION, SOLUTION PARENTERAL at 20:31

## 2018-09-18 RX ADMIN — SODIUM CHLORIDE 1000 ML: 900 INJECTION, SOLUTION INTRAVENOUS at 20:09

## 2018-09-18 NOTE — ED PROVIDER NOTES
EMERGENCY DEPARTMENT HISTORY AND PHYSICAL EXAM 
 
 
Date: 9/18/2018 Patient Name: Bhavani Duncan History of Presenting Illness Chief Complaint Patient presents with  Urinary Frequency  
  pt reports increased urination, increased thirst and not feeling well, vomited today x2, reports he checked his blood sugar and it was over 600  
 High Blood Sugar History Provided By: Patient and Patient's Wife HPI: Bhavani Duncan, 40 y.o. male with no significant PMHx, presents ambulatory to the ED with cc of increased thirst for a couple nights. He reports associated symptoms of urinary frequency (4-6 times a night), fatigue, increased sweating at night, nausea, and vomiting. Pt's wife states his daughter has gestational diabetes, to which she had encouraged the pt to check his blood sugar using her glucometer. His first reading was 593 and about 10-15 minutes later, he had rechecked it, to which the glucometer was unable to read due to a maximum limit of 600. Wife states the pt has not been formally dx'd with DM. She additionally notes the pt has been drinking about a gallon of milk daily. There are no other complaints, changes, or physical findings at this time. PCP: None Current Outpatient Prescriptions Medication Sig Dispense Refill  glucose blood VI test strips (BLOOD GLUCOSE TEST) strip Use as directed 100 Strip 0  Blood-Glucose Meter monitoring kit Use as directed 1 Kit 0  
 metFORMIN (GLUCOPHAGE) 500 mg tablet Take 1 Tab by mouth two (2) times daily (with meals) for 30 days. 60 Tab 0  
 aspirin delayed-release 81 mg tablet Take 1 Tab by mouth daily. 30 Tab 11  
 metoprolol tartrate (LOPRESSOR) 25 mg tablet Take 1 Tab by mouth two (2) times a day. 180 Tab 3  
 nitroglycerin (NITROSTAT) 0.4 mg SL tablet 1 Tab by SubLINGual route every five (5) minutes as needed (call 911 if not relieved by 3). 25 Tab 1  raNITIdine (ZANTAC) 150 mg tablet Take 1 Tab by mouth two (2) times a day. 180 Tab 0  
 naproxen (NAPROSYN) 500 mg tablet Take 1 Tab by mouth every twelve (12) hours as needed for Pain. 20 Tab 0 Past History Past Medical History: No past medical history on file. Past Surgical History: No past surgical history on file. Family History: 
Family History Problem Relation Age of Onset  Heart Disease Father 48 Social History: 
Social History Substance Use Topics  Smoking status: Former Smoker Types: Cigarettes Quit date: 9/6/2014  Smokeless tobacco: Current User Types: Chew  Alcohol use Yes Comment: occasionally Allergies: Allergies Allergen Reactions  Imdur [Isosorbide Mononitrate] Other (comments) Severe headache Review of Systems Review of Systems Constitutional: Positive for diaphoresis and fatigue. Negative for chills and fever. HENT: Negative for congestion and rhinorrhea. Eyes: Negative for visual disturbance. Respiratory: Negative for cough, shortness of breath and wheezing. Cardiovascular: Negative for chest pain and palpitations. Gastrointestinal: Positive for nausea and vomiting. Negative for abdominal distention, abdominal pain, constipation and diarrhea. Endocrine: Positive for polydipsia and polyuria. Genitourinary: Negative for difficulty urinating and dysuria. Musculoskeletal: Negative. Skin: Negative for rash. Neurological: Negative for dizziness, weakness and light-headedness. Psychiatric/Behavioral: Negative for suicidal ideas. Physical Exam  
Physical Exam  
Constitutional: He is oriented to person, place, and time. He appears well-developed and well-nourished. No distress. HENT:  
Head: Normocephalic and atraumatic. Mouth/Throat: Oropharynx is clear and moist.  
Eyes: Conjunctivae and EOM are normal.  
Neck: Neck supple. No JVD present. No tracheal deviation present. Cardiovascular: Normal rate, regular rhythm and intact distal pulses. Exam reveals no gallop and no friction rub. No murmur heard. Pulmonary/Chest: Effort normal and breath sounds normal. No stridor. No respiratory distress. He has no wheezes. Abdominal: Soft. Bowel sounds are normal. He exhibits no distension and no mass. There is no tenderness. There is no guarding. Musculoskeletal: Normal range of motion. He exhibits no edema or tenderness. No deformity Neurological: He is alert and oriented to person, place, and time. He has normal strength. No focal deficits Skin: Skin is warm, dry and intact. No rash noted. Psychiatric: He has a normal mood and affect. His behavior is normal. Judgment and thought content normal.  
Nursing note and vitals reviewed. Diagnostic Study Results Labs - Recent Results (from the past 12 hour(s)) GLUCOSE, POC Collection Time: 09/18/18  6:17 PM  
Result Value Ref Range Glucose (POC) 564 (H) 65 - 100 mg/dL Performed by Mae Liriano CBC WITH AUTOMATED DIFF Collection Time: 09/18/18  6:41 PM  
Result Value Ref Range WBC 10.5 4.1 - 11.1 K/uL  
 RBC 4.97 4.10 - 5.70 M/uL  
 HGB 15.9 12.1 - 17.0 g/dL HCT 45.8 36.6 - 50.3 % MCV 92.2 80.0 - 99.0 FL  
 MCH 32.0 26.0 - 34.0 PG  
 MCHC 34.7 30.0 - 36.5 g/dL  
 RDW 12.9 11.5 - 14.5 % PLATELET 977 472 - 703 K/uL MPV 10.7 8.9 - 12.9 FL  
 NRBC 0.0 0  WBC ABSOLUTE NRBC 0.00 0.00 - 0.01 K/uL NEUTROPHILS 63 32 - 75 % LYMPHOCYTES 26 12 - 49 % MONOCYTES 8 5 - 13 % EOSINOPHILS 2 0 - 7 % BASOPHILS 0 0 - 1 % IMMATURE GRANULOCYTES 1 (H) 0.0 - 0.5 % ABS. NEUTROPHILS 6.7 1.8 - 8.0 K/UL  
 ABS. LYMPHOCYTES 2.8 0.8 - 3.5 K/UL  
 ABS. MONOCYTES 0.9 0.0 - 1.0 K/UL  
 ABS. EOSINOPHILS 0.2 0.0 - 0.4 K/UL  
 ABS. BASOPHILS 0.0 0.0 - 0.1 K/UL  
 ABS. IMM. GRANS. 0.1 (H) 0.00 - 0.04 K/UL  
 DF AUTOMATED METABOLIC PANEL, COMPREHENSIVE Collection Time: 09/18/18  6:41 PM  
Result Value Ref Range  Sodium 129 (L) 136 - 145 mmol/L  
 Potassium 4.4 3.5 - 5.1 mmol/L Chloride 91 (L) 97 - 108 mmol/L  
 CO2 31 21 - 32 mmol/L Anion gap 7 5 - 15 mmol/L Glucose 479 (H) 65 - 100 mg/dL BUN 30 (H) 6 - 20 MG/DL Creatinine 1.32 (H) 0.70 - 1.30 MG/DL  
 BUN/Creatinine ratio 23 (H) 12 - 20 GFR est AA >60 >60 ml/min/1.73m2 GFR est non-AA 60 (L) >60 ml/min/1.73m2 Calcium 9.5 8.5 - 10.1 MG/DL Bilirubin, total 0.6 0.2 - 1.0 MG/DL  
 ALT (SGPT) 65 12 - 78 U/L  
 AST (SGOT) 23 15 - 37 U/L Alk. phosphatase 102 45 - 117 U/L Protein, total 8.2 6.4 - 8.2 g/dL Albumin 4.1 3.5 - 5.0 g/dL Globulin 4.1 (H) 2.0 - 4.0 g/dL A-G Ratio 1.0 (L) 1.1 - 2.2 LIPASE Collection Time: 09/18/18  6:41 PM  
Result Value Ref Range Lipase 412 (H) 73 - 393 U/L  
URINALYSIS W/ RFLX MICROSCOPIC Collection Time: 09/18/18  8:21 PM  
Result Value Ref Range Color YELLOW/STRAW Appearance CLEAR CLEAR Specific gravity >1.030 (H) 1.003 - 1.030  
 pH (UA) 5.0 5.0 - 8.0 Protein NEGATIVE  NEG mg/dL Glucose >1000 (A) NEG mg/dL Ketone NEGATIVE  NEG mg/dL Bilirubin NEGATIVE  NEG Blood NEGATIVE  NEG Urobilinogen 0.2 0.2 - 1.0 EU/dL Nitrites NEGATIVE  NEG Leukocyte Esterase NEGATIVE  NEG Radiologic Studies - No orders to display Medical Decision Making I am the first provider for this patient. I reviewed the vital signs, available nursing notes, past medical history, past surgical history, family history and social history. Vital Signs-Reviewed the patient's vital signs. Patient Vitals for the past 12 hrs: 
 Temp Pulse Resp BP SpO2  
09/18/18 2158 - - - - 95 % 09/18/18 2145 - - - 124/73 90 % 09/18/18 2130 - - - 115/76 93 % 09/18/18 2115 - - - 120/70 95 % 09/18/18 2100 - - - 134/74 96 % 09/18/18 2045 - - - 138/90 96 % 09/18/18 2030 - - - 144/87 95 % 09/18/18 2015 - - - 132/90 95 % 09/18/18 2011 - - - (!) 128/98 95 % 09/18/18 1815 98.4 °F (36.9 °C) 85 16 (!) 166/108 97 % Pulse Oximetry Analysis - 95% on RA Cardiac Monitor:  
Rate: 79 bpm 
Rhythm: Normal Sinus Rhythm Records Reviewed: Nursing Notes, Old Medical Records, Previous Radiology Studies and Previous Laboratory Studies Provider Notes (Medical Decision Making): DDx: hyperglycemia, DM, electrolyte abnormality, MARISELA, dehydration ED Course:  
Initial assessment performed. The patients presenting problems have been discussed, and they are in agreement with the care plan formulated and outlined with them. I have encouraged them to ask questions as they arise throughout their visit. PROGRESS NOTE: 
9:58 PM 
Pt's blood sugar is 301. Critical Care Time:  
0 Disposition: 
DISCHARGE NOTE 
9:59 PM 
The patient has been re-evaluated and is ready for discharge. Reviewed available results with patient. Counseled patient on diagnosis and care plan. Patient has expressed understanding, and all questions have been answered. Patient agrees with plan and agrees to follow up as recommended, or return to the ED if their symptoms worsen. Discharge instructions have been provided and explained to the patient, along with reasons to return to the ED. PLAN: 
1. Discharge Current Discharge Medication List  
  
START taking these medications Details  
glucose blood VI test strips (BLOOD GLUCOSE TEST) strip Use as directed Qty: 100 Strip, Refills: 0 Blood-Glucose Meter monitoring kit Use as directed Qty: 1 Kit, Refills: 0  
  
metFORMIN (GLUCOPHAGE) 500 mg tablet Take 1 Tab by mouth two (2) times daily (with meals) for 30 days. Qty: 60 Tab, Refills: 0  
  
  
 
2. Follow-up Information Follow up With Details Comments Contact Info [facility] Schedule an appointment as soon as possible for a visit Diabetic Clinic every Wednesday [address] [place] [address] 
[phone] [facility] EMERGENCY DEPT  As needed, If symptoms worsen [address] [facility] 
[phone] Return to ED if worse Diagnosis Clinical Impression: 1. Diabetes mellitus, new onset ([address] Attestations: This note is prepared by Maia Durand, acting as Scribe for Irena Calvert DO. Irena Calvert DO: The scribe's documentation has been prepared under my direction and personally reviewed by me in its entirety. I confirm that the note above accurately reflects all work, treatment, procedures, and medical decision making performed by me. This note will not be viewable in [address]

## 2018-09-19 NOTE — ED NOTES
Pt presents to ED with increase in \"peeing and drinking\"; his daughter has diabetes. He denies any dx. Pt last BG over 450 blood work. Pt AOX4. Girlfriend bedside. Pt denies SOB, denies diarrhea. Nausea and vomiting since this morning. Pt has never had BG issues before. Pt was seen here for CP, no abnormal results found. Pt bilaterally COA. Pt reports feeling diaphoretic for the past few days. Bed locked and in low position. Side rails up x 2. Call bell within reach. Pt medicated per MAR. UA sent to lab.

## 2018-09-19 NOTE — DISCHARGE INSTRUCTIONS
Learning About Diabetes Food Guidelines  Your Care Instructions    Meal planning is important to manage diabetes. It helps keep your blood sugar at a target level (which you set with your doctor). You don't have to eat special foods. You can eat what your family eats, including sweets once in a while. But you do have to pay attention to how often you eat and how much you eat of certain foods. You may want to work with a dietitian or a certified diabetes educator (CDE) to help you plan meals and snacks. A dietitian or CDE can also help you lose weight if that is one of your goals. What should you know about eating carbs? Managing the amount of carbohydrate (carbs) you eat is an important part of healthy meals when you have diabetes. Carbohydrate is found in many foods. · Learn which foods have carbs. And learn the amounts of carbs in different foods. ¨ Bread, cereal, pasta, and rice have about 15 grams of carbs in a serving. A serving is 1 slice of bread (1 ounce), ½ cup of cooked cereal, or 1/3 cup of cooked pasta or rice. ¨ Fruits have 15 grams of carbs in a serving. A serving is 1 small fresh fruit, such as an apple or orange; ½ of a banana; ½ cup of cooked or canned fruit; ½ cup of fruit juice; 1 cup of melon or raspberries; or 2 tablespoons of dried fruit. ¨ Milk and no-sugar-added yogurt have 15 grams of carbs in a serving. A serving is 1 cup of milk or 2/3 cup of no-sugar-added yogurt. ¨ Starchy vegetables have 15 grams of carbs in a serving. A serving is ½ cup of mashed potatoes or sweet potato; 1 cup winter squash; ½ of a small baked potato; ½ cup of cooked beans; or ½ cup cooked corn or green peas. · Learn how much carbs to eat each day and at each meal. A dietitian or CDE can teach you how to keep track of the amount of carbs you eat. This is called carbohydrate counting. · If you are not sure how to count carbohydrate grams, use the Plate Method to plan meals.  It is a good, quick way to make sure that you have a balanced meal. It also helps you spread carbs throughout the day. ¨ Divide your plate by types of foods. Put non-starchy vegetables on half the plate, meat or other protein food on one-quarter of the plate, and a grain or starchy vegetable in the final quarter of the plate. To this you can add a small piece of fruit and 1 cup of milk or yogurt, depending on how many carbs you are supposed to eat at a meal.  · Try to eat about the same amount of carbs at each meal. Do not \"save up\" your daily allowance of carbs to eat at one meal.  · Proteins have very little or no carbs per serving. Examples of proteins are beef, chicken, turkey, fish, eggs, tofu, cheese, cottage cheese, and peanut butter. A serving size of meat is 3 ounces, which is about the size of a deck of cards. Examples of meat substitute serving sizes (equal to 1 ounce of meat) are 1/4 cup of cottage cheese, 1 egg, 1 tablespoon of peanut butter, and ½ cup of tofu. How can you eat out and still eat healthy? · Learn to estimate the serving sizes of foods that have carbohydrate. If you measure food at home, it will be easier to estimate the amount in a serving of restaurant food. · If the meal you order has too much carbohydrate (such as potatoes, corn, or baked beans), ask to have a low-carbohydrate food instead. Ask for a salad or green vegetables. · If you use insulin, check your blood sugar before and after eating out to help you plan how much to eat in the future. · If you eat more carbohydrate at a meal than you had planned, take a walk or do other exercise. This will help lower your blood sugar. What else should you know? · Limit saturated fat, such as the fat from meat and dairy products. This is a healthy choice because people who have diabetes are at higher risk of heart disease. So choose lean cuts of meat and nonfat or low-fat dairy products.  Use olive or canola oil instead of butter or shortening when cooking. · Don't skip meals. Your blood sugar may drop too low if you skip meals and take insulin or certain medicines for diabetes. · Check with your doctor before you drink alcohol. Alcohol can cause your blood sugar to drop too low. Alcohol can also cause a bad reaction if you take certain diabetes medicines. Follow-up care is a key part of your treatment and safety. Be sure to make and go to all appointments, and call your doctor if you are having problems. It's also a good idea to know your test results and keep a list of the medicines you take. Where can you learn more? Go to http://torito-kiki.info/. Enter D804 in the search box to learn more about \"Learning About Diabetes Food Guidelines. \"  Current as of: December 7, 2017  Content Version: 11.7  © 9311-1694 [employer]. Care instructions adapted under license by [employer] (which disclaims liability or warranty for this information). If you have questions about a medical condition or this instruction, always ask your healthcare professional. [address] any warranty or liability for your use of this information. Learning About Meal Planning for Diabetes  Why plan your meals? Meal planning can be a key part of managing diabetes. Planning meals and snacks with the right balance of carbohydrate, protein, and fat can help you keep your blood sugar at the target level you set with your doctor. You don't have to eat special foods. You can eat what your family eats, including sweets once in a while. But you do have to pay attention to how often you eat and how much you eat of certain foods. You may want to work with a dietitian or a certified diabetes educator. He or she can give you tips and meal ideas and can answer your questions about meal planning. This health professional can also help you reach a healthy weight if that is one of your goals. What plan is right for you?   Your dietitian or diabetes educator may suggest that you start with the plate format or carbohydrate counting. The plate format  The plate format is a simple way to help you manage how you eat. You plan meals by learning how much space each food should take on a plate. Using the plate format helps you spread carbohydrate throughout the day. It can make it easier to keep your blood sugar level within your target range. It also helps you see if you're eating healthy portion sizes. To use the plate format, you put non-starchy vegetables on half your plate. Add meat or meat substitutes on one-quarter of the plate. Put a grain or starchy vegetable (such as brown rice or a potato) on the final quarter of the plate. You can add a small piece of fruit and some low-fat or fat-free milk or yogurt, depending on your carbohydrate goal for each meal.  Here are some tips for using the plate format:  · Make sure that you are not using an oversized plate. A 9-inch plate is best. Many restaurants use larger plates. · Get used to using the plate format at home. Then you can use it when you eat out. · Write down your questions about using the plate format. Talk to your doctor, a dietitian, or a diabetes educator about your concerns. Carbohydrate counting  With carbohydrate counting, you plan meals based on the amount of carbohydrate in each food. Carbohydrate raises blood sugar higher and more quickly than any other nutrient. It is found in desserts, breads and cereals, and fruit. It's also found in starchy vegetables such as potatoes and corn, grains such as rice and pasta, and milk and yogurt. Spreading carbohydrate throughout the day helps keep your blood sugar levels within your target range. Your daily amount depends on several things, including your weight, how active you are, which diabetes medicines you take, and what your goals are for your blood sugar levels.  A registered dietitian or diabetes educator can help you plan how much carbohydrate to include in each meal and snack. A guideline for your daily amount of carbohydrate is:  · 45 to 60 grams at each meal. That's about the same as 3 to 4 carbohydrate servings. · 15 to 20 grams at each snack. That's about the same as 1 carbohydrate serving. The Nutrition Facts label on packaged foods tells you how much carbohydrate is in a serving of the food. First, look at the serving size on the food label. Is that the amount you eat in a serving? All of the nutrition information on a food label is based on that serving size. So if you eat more or less than that, you'll need to adjust the other numbers. Total carbohydrate is the next thing you need to look for on the label. If you count carbohydrate servings, one serving of carbohydrate is 15 grams. For foods that don't come with labels, such as fresh fruits and vegetables, you'll need a guide that lists carbohydrate in these foods. Ask your doctor, dietitian, or diabetes educator about books or other nutrition guides you can use. If you take insulin, you need to know how many grams of carbohydrate are in a meal. This lets you know how much rapid-acting insulin to take before you eat. If you use an insulin pump, you get a constant rate of insulin during the day. So the pump must be programmed at meals to give you extra insulin to cover the rise in blood sugar after meals. When you know how much carbohydrate you will eat, you can take the right amount of insulin. Or, if you always use the same amount of insulin, you need to make sure that you eat the same amount of carbohydrate at meals. If you need more help to understand carbohydrate counting and food labels, ask your doctor, dietitian, or diabetes educator. How do you get started with meal planning? Here are some tips to get started:  · Plan your meals a week at a time. Don't forget to include snacks too. · Use cookbooks or online recipes to plan several main meals.  Plan some quick meals for busy nights. You also can double some recipes that freeze well. Then you can save half for other busy nights when you don't have time to cook. · Make sure you have the ingredients you need for your recipes. If you're running low on basic items, put these items on your shopping list too. · List foods that you use to make breakfasts, lunches, and snacks. List plenty of fruits and vegetables. · Post this list on the refrigerator. Add to it as you think of more things you need. · Take the list to the store to do your weekly shopping. Follow-up care is a key part of your treatment and safety. Be sure to make and go to all appointments, and call your doctor if you are having problems. It's also a good idea to know your test results and keep a list of the medicines you take. Where can you learn more? Go to http://torito-kiki.info/. Clair Gia in the search box to learn more about \"Learning About Meal Planning for Diabetes. \"  Current as of: December 7, 2017  Content Version: 11.7  © 7727-3803 [employer]. Care instructions adapted under license by [employer] (which disclaims liability or warranty for this information). If you have questions about a medical condition or this instruction, always ask your healthcare professional. [address] any warranty or liability for your use of this information. Home Blood Glucose Test: About This Test  What is it? A home blood glucose test measures the amount of a type of sugar, called glucose, in your blood. Why is this test done? People who have diabetes need to check the amount of glucose in their blood. A home blood glucose test is an easy way to test your blood at home or when you are away from home. The results help you know when to take action to keep your blood glucose levels in a target range.   How can you prepare for the test?  · Check the expiration date on the bottle of testing strips. Do not use test strips that have . · Match the code number on the testing strips bottle with the number on the meter. If the numbers do not match, follow the directions with the meter for changing the code number. What happens before the test?  The supplies you will need for testing blood glucose include:  · A blood glucose meter. · Testing strips. These are made to be used with a specific model of meter. · Sugar control solutions. Some meters require a specific solution. Many new meters are made to operate without a control solution. · Short needles called lancets for pricking your skin. · A pen-sized dhaliwal for the lancet (lancet device), which positions the lancet and controls how deeply it goes into your skin. · Clean cotton balls. These are used to stop the bleeding from the testing site. What happens during the test?  A home blood glucose test involves pricking your finger, palm, or forearm with a lancet to collect a drop of blood. The blood drop is placed on a test strip, which you insert into the blood glucose meter. The instructions for testing are slightly different for each blood glucose meter model. Follow the instructions that came with your meter. · Wash your hands with warm, soapy water. Dry them well with a clean towel. You may also use an alcohol wipe to clean your finger or other site, but make sure your hands are dry before the test.  · Insert a clean lancet into the lancet device. · Remove a test strip from the test strip bottle. Replace the lid immediately to keep moisture away from the other strips. · Follow the instructions that came with your meter to get it ready. · Use the lancet device to stick the side of your fingertip with the lancet. Do not stick the tip of your finger. Some blood sugar meters use lancet devices that take the blood sample from other sites, such as the palm of the hand or the forearm.  But the finger is usually the most accurate place to test blood sugar. · Put a drop of blood on the correct spot on the test strip. · Apply pressure with a clean cotton ball to stop the bleeding. · Follow the directions that came with the meter to get the results. · Write down the results and the time that you tested your blood. Some meters will store the results for you. What else should you know about the test?  The American Diabetes Association (ADA) recommends that you stay within the following blood glucose level ranges. But depending on your health, you and your doctor may set a different range for you. For nonpregnant adults with diabetes:  · 80 milligrams per deciliter (mg/dL) to 130 mg/dL before a meal  · Less than 180 mg/dL 1 to 2 hours after a meal  For women who have diabetes related to pregnancy (gestational diabetes):  · 95 mg/dL or less before breakfast  · 120 to 140 mg/dL (or lower) 1 to 2 hours after a meal  How long does the test take? · The blood glucose meter will show the results of the test in a minute or less. Where can you learn more? Go to http://torito-kiki.info/. Enter A781 in the search box to learn more about \"Home Blood Glucose Test: About This Test.\"  Current as of: December 7, 2017  Content Version: 11.7  © 6835-4732 [email or web address]. Care instructions adapted under license by [employer] (which disclaims liability or warranty for this information). If you have questions about a medical condition or this instruction, always ask your healthcare professional. [place] [address] any warranty or liability for your use of this information. Type 2 Diabetes: Care Instructions  Your Care Instructions    Type 2 diabetes is a disease that develops when the body's tissues cannot use insulin properly. Over time, the pancreas cannot make enough insulin. Insulin is a hormone that helps the body's cells use sugar (glucose) for energy.  It also helps the body store extra sugar in muscle, fat, and liver cells. Without insulin, the sugar cannot get into the cells to do its work. It stays in the blood instead. This can cause high blood sugar levels. A person has diabetes when the blood sugar stays too high too much of the time. Over time, diabetes can lead to diseases of the heart, blood vessels, nerves, kidneys, and eyes. You may be able to control your blood sugar by losing weight, eating a healthy diet, and getting daily exercise. You may also have to take insulin or other diabetes medicine. Follow-up care is a key part of your treatment and safety. Be sure to make and go to all appointments. Call your doctor if you are having problems. It's also a good idea to know your test results and keep a list of the medicines you take. How can you care for yourself at home? · Keep your blood sugar at a target level (which you set with your doctor). ¨ Eat a good diet that spreads carbohydrate throughout the day. Carbohydrate-the body's main source of fuel-affects blood sugar more than any other nutrient. Carbohydrate is in fruits, vegetables, milk, and yogurt. It also is in breads, cereals, vegetables such as potatoes and corn, and sugary foods such as candy and cakes. ¨ Aim for 30 minutes of exercise on most, preferably all, days of the week. Walking is a good choice. You also may want to do other activities, such as running, swimming, cycling, or playing tennis or team sports. If your doctor says it's okay, do muscle-strengthening exercises at least 2 times a week. ¨ Take your medicines exactly as prescribed. Call your doctor if you think you are having a problem with your medicine. You will get more details on the specific medicines your doctor prescribes. · Check your blood sugar as often as your doctor recommends. It is important to keep track of any symptoms you have, such as low blood sugar. Also tell your doctor if you have any changes in your activities, diet, or insulin use.   · Talk to your doctor before you start taking aspirin every day. Aspirin can help certain people lower their risk of a heart attack or stroke. But taking aspirin isn't right for everyone, because it can cause serious bleeding. · Do not smoke. If you need help quitting, talk to your doctor about stop-smoking programs and medicines. These can increase your chances of quitting for good. · Keep your cholesterol and blood pressure at normal levels. You may need to take one or more medicines to reach your goals. Take them exactly as directed. Do not stop or change a medicine without talking to your doctor first.  When should you call for help? Call 911 anytime you think you may need emergency care. For example, call if:    · You passed out (lost consciousness), or you suddenly become very sleepy or confused. (You may have very low blood sugar.)    Call your doctor now or seek immediate medical care if:    · Your blood sugar is 300 mg/dL or is higher than the level your doctor has set for you.     · You have symptoms of low blood sugar, such as:  ¨ Sweating. ¨ Feeling nervous, shaky, and weak. ¨ Extreme hunger and slight nausea. ¨ Dizziness and headache. ¨ Blurred vision. ¨ Confusion.    Watch closely for changes in your health, and be sure to contact your doctor if:    · You often have problems controlling your blood sugar.     · You have symptoms of long-term diabetes problems, such as:  ¨ New vision changes. ¨ New pain, numbness, or tingling in your hands or feet. ¨ Skin problems. Where can you learn more? Go to http://torito-kiki.info/. Enter C553 in the search box to learn more about \"Type 2 Diabetes: Care Instructions. \"  Current as of: December 7, 2017  Content Version: 11.7  © 8491-7131 [employer]. Care instructions adapted under license by [employer] (which disclaims liability or warranty for this information).  If you have questions about a medical condition or this instruction, always ask your healthcare professional. [place] [address] any warranty or liability for your use of this information.

## 2018-09-19 NOTE — ED NOTES
Dr Hailee Cardenas has reviewed discharge instructions with the patient. The patient verbalized understanding. Pt. A&Ox4, respirations even and unlabored. VS stable as noted in flowsheet. Pt Declined wheelchair assist from department; paperwork in hand.

## 2018-09-19 NOTE — ED NOTES
Reports drinking a lot of milk. Denies alcohol abuse. [facility] Dr Cedric Bhandari bedside.

## 2018-09-19 NOTE — ED NOTES
BG 301mg/dL ; Dr Calvin Valencia aware. WCTM. Pt. Resting comfortably in bed, denies needs at this time. Bed locked and low, call bell in reach.

## 2018-09-20 ENCOUNTER — CLINICAL SUPPORT (OUTPATIENT)
Dept: CARDIOLOGY CLINIC | Age: 43
End: 2018-09-20

## 2018-09-20 DIAGNOSIS — R07.9 CHEST PAIN, UNSPECIFIED TYPE: ICD-10-CM

## 2018-09-20 DIAGNOSIS — I20.9 ANGINA PECTORIS (HCC): ICD-10-CM

## 2018-09-20 DIAGNOSIS — F17.201 MODERATE TOBACCO USE DISORDER, IN SUSTAINED REMISSION: ICD-10-CM

## 2018-09-21 ENCOUNTER — TELEPHONE (OUTPATIENT)
Dept: CARDIOLOGY CLINIC | Age: 43
End: 2018-09-21

## 2018-09-21 NOTE — TELEPHONE ENCOUNTER
Please call Fara Hicks (on [address]) in reference to test results and the call the patient received from someone today.

## 2018-09-21 NOTE — TELEPHONE ENCOUNTER
Spoke with  Dk Barrera Verified patient with two identifiers.   Luiz Pickens will you contact her for this procedure thanks

## 2018-09-21 NOTE — PROGRESS NOTES
I discussed Mr. Nieves abnormal stress test with him, concerning for myocardial ischemia. He continues to have intermittent chest pressure at rest, none bad enough to require sublingual nitroglycerin. I discussed the risks and benefits of cardiac catheterization plus or minus PCI with the patient expressed understanding and wishes to proceed. Laura Sandoval is expecting your call. I have held a spot for this coming Friday, 9/28/2018 at 845. Lab slip printed. Let me know if any questions or concerns.

## 2018-09-28 ENCOUNTER — HOSPITAL ENCOUNTER (OUTPATIENT)
Dept: CARDIAC CATH/INVASIVE PROCEDURES | Age: 43
Discharge: HOME | End: 2018-09-28
Attending: INTERNAL MEDICINE | Admitting: INTERNAL MEDICINE
Payer: SELF-PAY

## 2018-09-28 VITALS
HEART RATE: 58 BPM | SYSTOLIC BLOOD PRESSURE: 107 MMHG | TEMPERATURE: 98.2 F | RESPIRATION RATE: 16 BRPM | OXYGEN SATURATION: 98 % | WEIGHT: 264 LBS | BODY MASS INDEX: 37.8 KG/M2 | DIASTOLIC BLOOD PRESSURE: 91 MMHG | HEIGHT: 70 IN

## 2018-09-28 PROBLEM — I20.9 ANGINA, CLASS III (HCC): Status: ACTIVE | Noted: 2018-09-28

## 2018-09-28 PROBLEM — E11.9 DIABETES MELLITUS TYPE 2, CONTROLLED (HCC): Status: ACTIVE | Noted: 2018-09-28

## 2018-09-28 PROBLEM — Z98.890 S/P CARDIAC CATH: Status: ACTIVE | Noted: 2018-09-28

## 2018-09-28 PROBLEM — R93.1 ABNORMAL NUCLEAR CARDIAC IMAGING TEST: Status: ACTIVE | Noted: 2018-09-28

## 2018-09-28 LAB
ALBUMIN SERPL-MCNC: 4.5 G/DL (ref 3.5–5.5)
ALBUMIN/GLOB SERPL: 2.1 {RATIO} (ref 1.2–2.2)
ALP SERPL-CCNC: 71 IU/L (ref 39–117)
ALT SERPL-CCNC: 77 IU/L (ref 0–44)
AST SERPL-CCNC: 39 IU/L (ref 0–40)
BILIRUB SERPL-MCNC: 0.8 MG/DL (ref 0–1.2)
BUN SERPL-MCNC: 21 MG/DL (ref 6–24)
BUN/CREAT SERPL: 23 (ref 9–20)
CALCIUM SERPL-MCNC: 9.2 MG/DL (ref 8.7–10.2)
CHLORIDE SERPL-SCNC: 105 MMOL/L (ref 96–106)
CHOLEST SERPL-MCNC: 189 MG/DL (ref 100–199)
CK SERPL-CCNC: 161 U/L (ref 24–204)
CO2 SERPL-SCNC: 20 MMOL/L (ref 20–29)
CREAT SERPL-MCNC: 0.9 MG/DL (ref 0.76–1.27)
ERYTHROCYTE [DISTWIDTH] IN BLOOD BY AUTOMATED COUNT: 14.3 % (ref 12.3–15.4)
GLOBULIN SER CALC-MCNC: 2.1 G/DL (ref 1.5–4.5)
GLUCOSE BLD STRIP.AUTO-MCNC: 197 MG/DL (ref 65–100)
GLUCOSE BLD STRIP.AUTO-MCNC: 229 MG/DL (ref 65–100)
GLUCOSE SERPL-MCNC: 216 MG/DL (ref 65–99)
HCT VFR BLD AUTO: 47.3 % (ref 37.5–51)
HDLC SERPL-MCNC: 32 MG/DL
HGB BLD-MCNC: 15.8 G/DL (ref 13–17.7)
INR PPP: 1 (ref 0.8–1.2)
INTERPRETATION, 910389: NORMAL
LDLC SERPL CALC-MCNC: 130 MG/DL (ref 0–99)
MCH RBC QN AUTO: 32.1 PG (ref 26.6–33)
MCHC RBC AUTO-ENTMCNC: 33.4 G/DL (ref 31.5–35.7)
MCV RBC AUTO: 96 FL (ref 79–97)
PLATELET # BLD AUTO: 389 X10E3/UL (ref 150–379)
POTASSIUM SERPL-SCNC: 4.5 MMOL/L (ref 3.5–5.2)
PROT SERPL-MCNC: 6.6 G/DL (ref 6–8.5)
PROTHROMBIN TIME: 10.2 SEC (ref 9.1–12)
RBC # BLD AUTO: 4.92 X10E6/UL (ref 4.14–5.8)
SERVICE CMNT-IMP: ABNORMAL
SERVICE CMNT-IMP: ABNORMAL
SODIUM SERPL-SCNC: 138 MMOL/L (ref 134–144)
TRIGL SERPL-MCNC: 136 MG/DL (ref 0–149)
VLDLC SERPL CALC-MCNC: 27 MG/DL (ref 5–40)
WBC # BLD AUTO: 9.2 X10E3/UL (ref 3.4–10.8)

## 2018-09-28 PROCEDURE — 77030015766

## 2018-09-28 PROCEDURE — 82962 GLUCOSE BLOOD TEST: CPT

## 2018-09-28 PROCEDURE — 77030010221 HC SPLNT WR POS TELE -B

## 2018-09-28 PROCEDURE — 74011636320 HC RX REV CODE- 636/320: Performed by: INTERNAL MEDICINE

## 2018-09-28 PROCEDURE — 74011250636 HC RX REV CODE- 250/636: Performed by: INTERNAL MEDICINE

## 2018-09-28 PROCEDURE — C1894 INTRO/SHEATH, NON-LASER: HCPCS

## 2018-09-28 PROCEDURE — 77030008543 HC TBNG MON PRSS MRTM -A

## 2018-09-28 PROCEDURE — 77030004549 HC CATH ANGI DX PRF MRTM -A

## 2018-09-28 PROCEDURE — 99153 MOD SED SAME PHYS/QHP EA: CPT

## 2018-09-28 PROCEDURE — C1769 GUIDE WIRE: HCPCS

## 2018-09-28 PROCEDURE — 77030019698 HC SYR ANGI MDLON MRTM -A

## 2018-09-28 PROCEDURE — 74011000250 HC RX REV CODE- 250: Performed by: INTERNAL MEDICINE

## 2018-09-28 PROCEDURE — 74011250637 HC RX REV CODE- 250/637: Performed by: INTERNAL MEDICINE

## 2018-09-28 PROCEDURE — 77030019569 HC BND COMPR RAD TERU -B

## 2018-09-28 PROCEDURE — 74011250636 HC RX REV CODE- 250/636

## 2018-09-28 RX ORDER — FENTANYL CITRATE 50 UG/ML
INJECTION, SOLUTION INTRAMUSCULAR; INTRAVENOUS
Status: DISCONTINUED
Start: 2018-09-28 | End: 2018-09-28 | Stop reason: HOSPADM

## 2018-09-28 RX ORDER — HEPARIN SODIUM 1000 [USP'U]/ML
INJECTION, SOLUTION INTRAVENOUS; SUBCUTANEOUS
Status: DISCONTINUED
Start: 2018-09-28 | End: 2018-09-28 | Stop reason: HOSPADM

## 2018-09-28 RX ORDER — MIDAZOLAM HYDROCHLORIDE 1 MG/ML
.5-2 INJECTION, SOLUTION INTRAMUSCULAR; INTRAVENOUS
Status: DISCONTINUED | OUTPATIENT
Start: 2018-09-28 | End: 2018-09-28 | Stop reason: HOSPADM

## 2018-09-28 RX ORDER — SODIUM CHLORIDE 0.9 % (FLUSH) 0.9 %
5-10 SYRINGE (ML) INJECTION EVERY 8 HOURS
Status: DISCONTINUED | OUTPATIENT
Start: 2018-09-28 | End: 2018-09-28 | Stop reason: HOSPADM

## 2018-09-28 RX ORDER — SODIUM CHLORIDE 9 MG/ML
100 INJECTION, SOLUTION INTRAVENOUS CONTINUOUS
Status: DISCONTINUED | OUTPATIENT
Start: 2018-09-28 | End: 2018-09-28 | Stop reason: HOSPADM

## 2018-09-28 RX ORDER — ACETAMINOPHEN 325 MG/1
650 TABLET ORAL
Status: DISCONTINUED | OUTPATIENT
Start: 2018-09-28 | End: 2018-09-28 | Stop reason: HOSPADM

## 2018-09-28 RX ORDER — SIMVASTATIN 40 MG/1
40 TABLET, FILM COATED ORAL
Qty: 30 TAB | Refills: 11 | Status: SHIPPED | OUTPATIENT
Start: 2018-09-28 | End: 2019-04-23 | Stop reason: SDUPTHER

## 2018-09-28 RX ORDER — LIDOCAINE HYDROCHLORIDE 10 MG/ML
INJECTION, SOLUTION EPIDURAL; INFILTRATION; INTRACAUDAL; PERINEURAL
Status: DISCONTINUED
Start: 2018-09-28 | End: 2018-09-28 | Stop reason: HOSPADM

## 2018-09-28 RX ORDER — SODIUM CHLORIDE 0.9 % (FLUSH) 0.9 %
5-10 SYRINGE (ML) INJECTION AS NEEDED
Status: DISCONTINUED | OUTPATIENT
Start: 2018-09-28 | End: 2018-09-28 | Stop reason: HOSPADM

## 2018-09-28 RX ORDER — FENTANYL CITRATE 50 UG/ML
25 INJECTION, SOLUTION INTRAMUSCULAR; INTRAVENOUS ONCE
Status: COMPLETED | OUTPATIENT
Start: 2018-09-28 | End: 2018-09-28

## 2018-09-28 RX ORDER — MIDAZOLAM HYDROCHLORIDE 1 MG/ML
2 INJECTION, SOLUTION INTRAMUSCULAR; INTRAVENOUS ONCE
Status: COMPLETED | OUTPATIENT
Start: 2018-09-28 | End: 2018-09-28

## 2018-09-28 RX ORDER — NALOXONE HYDROCHLORIDE 0.4 MG/ML
0.4 INJECTION, SOLUTION INTRAMUSCULAR; INTRAVENOUS; SUBCUTANEOUS AS NEEDED
Status: DISCONTINUED | OUTPATIENT
Start: 2018-09-28 | End: 2018-09-28 | Stop reason: HOSPADM

## 2018-09-28 RX ORDER — VERAPAMIL HYDROCHLORIDE 2.5 MG/ML
INJECTION, SOLUTION INTRAVENOUS
Status: DISCONTINUED
Start: 2018-09-28 | End: 2018-09-28 | Stop reason: HOSPADM

## 2018-09-28 RX ORDER — VERAPAMIL HYDROCHLORIDE 2.5 MG/ML
2.5 INJECTION, SOLUTION INTRAVENOUS ONCE
Status: COMPLETED | OUTPATIENT
Start: 2018-09-28 | End: 2018-09-28

## 2018-09-28 RX ORDER — RANITIDINE 150 MG/1
150 CAPSULE ORAL 2 TIMES DAILY
Qty: 60 CAP | Refills: 1 | Status: SHIPPED | OUTPATIENT
Start: 2018-09-28 | End: 2019-04-23 | Stop reason: SDUPTHER

## 2018-09-28 RX ORDER — FENTANYL CITRATE 50 UG/ML
25-50 INJECTION, SOLUTION INTRAMUSCULAR; INTRAVENOUS
Status: DISCONTINUED | OUTPATIENT
Start: 2018-09-28 | End: 2018-09-28 | Stop reason: HOSPADM

## 2018-09-28 RX ORDER — HEPARIN SODIUM 200 [USP'U]/100ML
INJECTION, SOLUTION INTRAVENOUS
Status: DISCONTINUED
Start: 2018-09-28 | End: 2018-09-28 | Stop reason: HOSPADM

## 2018-09-28 RX ORDER — LIDOCAINE HYDROCHLORIDE 10 MG/ML
1-30 INJECTION, SOLUTION EPIDURAL; INFILTRATION; INTRACAUDAL; PERINEURAL
Status: DISCONTINUED | OUTPATIENT
Start: 2018-09-28 | End: 2018-09-28 | Stop reason: HOSPADM

## 2018-09-28 RX ORDER — HEPARIN SODIUM 200 [USP'U]/100ML
500 INJECTION, SOLUTION INTRAVENOUS ONCE
Status: COMPLETED | OUTPATIENT
Start: 2018-09-28 | End: 2018-09-28

## 2018-09-28 RX ORDER — HEPARIN SODIUM 1000 [USP'U]/ML
1000-10000 INJECTION, SOLUTION INTRAVENOUS; SUBCUTANEOUS ONCE
Status: COMPLETED | OUTPATIENT
Start: 2018-09-28 | End: 2018-09-28

## 2018-09-28 RX ORDER — GUAIFENESIN 100 MG/5ML
81 LIQUID (ML) ORAL DAILY
Status: DISCONTINUED | OUTPATIENT
Start: 2018-09-28 | End: 2018-09-28

## 2018-09-28 RX ORDER — METFORMIN HYDROCHLORIDE 500 MG/1
500 TABLET ORAL 2 TIMES DAILY WITH MEALS
Qty: 60 TAB | Refills: 0 | Status: SHIPPED | OUTPATIENT
Start: 2018-09-28 | End: 2018-10-03 | Stop reason: DRUGHIGH

## 2018-09-28 RX ORDER — MIDAZOLAM HYDROCHLORIDE 1 MG/ML
INJECTION, SOLUTION INTRAMUSCULAR; INTRAVENOUS
Status: DISCONTINUED
Start: 2018-09-28 | End: 2018-09-28 | Stop reason: HOSPADM

## 2018-09-28 RX ADMIN — HEPARIN SODIUM IN SODIUM CHLORIDE 1000 UNITS: 200 INJECTION INTRAVENOUS at 08:59

## 2018-09-28 RX ADMIN — LIDOCAINE HYDROCHLORIDE 2 ML: 10 INJECTION, SOLUTION EPIDURAL; INFILTRATION; INTRACAUDAL; PERINEURAL at 09:09

## 2018-09-28 RX ADMIN — FENTANYL CITRATE 25 MCG: 50 INJECTION, SOLUTION INTRAMUSCULAR; INTRAVENOUS at 08:53

## 2018-09-28 RX ADMIN — MIDAZOLAM HYDROCHLORIDE 1 MG: 1 INJECTION, SOLUTION INTRAMUSCULAR; INTRAVENOUS at 09:07

## 2018-09-28 RX ADMIN — IOPAMIDOL 60 ML: 755 INJECTION, SOLUTION INTRAVENOUS at 09:25

## 2018-09-28 RX ADMIN — ASPIRIN 81 MG 81 MG: 81 TABLET ORAL at 07:51

## 2018-09-28 RX ADMIN — HEPARIN SODIUM 2500 UNITS: 1000 INJECTION INTRAVENOUS; SUBCUTANEOUS at 09:09

## 2018-09-28 RX ADMIN — MIDAZOLAM HYDROCHLORIDE 2 MG: 1 INJECTION, SOLUTION INTRAMUSCULAR; INTRAVENOUS at 09:25

## 2018-09-28 RX ADMIN — FENTANYL CITRATE 25 MCG: 50 INJECTION, SOLUTION INTRAMUSCULAR; INTRAVENOUS at 09:24

## 2018-09-28 RX ADMIN — HEPARIN SODIUM IN SODIUM CHLORIDE 1000 UNITS: 200 INJECTION INTRAVENOUS at 09:00

## 2018-09-28 RX ADMIN — VERAPAMIL HYDROCHLORIDE 2.5 MG: 2.5 INJECTION INTRAVENOUS at 09:25

## 2018-09-28 RX ADMIN — NITROGLYCERIN 200 MCG: 5 INJECTION, SOLUTION INTRAVENOUS at 09:12

## 2018-09-28 RX ADMIN — FENTANYL CITRATE 25 MCG: 50 INJECTION, SOLUTION INTRAMUSCULAR; INTRAVENOUS at 08:44

## 2018-09-28 RX ADMIN — MIDAZOLAM HYDROCHLORIDE 2 MG: 1 INJECTION, SOLUTION INTRAMUSCULAR; INTRAVENOUS at 08:53

## 2018-09-28 NOTE — PROGRESS NOTES
Mr. Francisco Parisi is s/p cardiac cath with no significant coronary artery disease With his hx of DM, HTN,, starting on Lipitor 40mg daily, continues on ASA, BB, and G4itwibos, Zantac Patient has no insurance and has completed the Care Card application for BS support, but this will take 4 months at least. 
Case Management contacted reference f/u with PCP within BS - PCP appts are a $50 payment per Case Management. Therefore, appointment has been made with PCP on 10/3/18.  
 
Follow up with Dr. Elvis Frank on 10/3/18 PM

## 2018-09-28 NOTE — IP AVS SNAPSHOT
[address] [facility] 
[phone] Patient: Jose Sanchez MRN: [id number] :1975 About your hospitalization You were admitted on:  2018 You last received care in the:  [facility] 2 INTRVNTNL CARDIO You were discharged on:  2018 Why you were hospitalized Your primary diagnosis was: Angina, Class Iii ([facility]) Your diagnoses also included:  Abnormal Nuclear Cardiac Imaging Test, Diabetes Mellitus Type 2, Controlled (Hcc), S/P Cardiac Cath Follow-up Information Follow up With Details Comments Contact Info None   None ([phone]) Patient stated that they have no PCP Tadeo Tanner MD Schedule an appointment as soon as possible for a visit in 2 weeks  [address] [facility] 
[phone] Discharge Orders None A check ewa indicates which time of day the medication should be taken. My Medications START taking these medications Instructions Each Dose to Equal  
 Morning Noon Evening Bedtime  
 raNITIdine hcl 150 mg capsule Your last dose was: Your next dose is: Take 150 mg by mouth two (2) times a day. 150 mg  
    
   
   
   
  
 simvastatin 40 mg tablet Commonly known as:  ZOCOR Your last dose was: Your next dose is: Take 1 Tab by mouth nightly. 40 mg CHANGE how you take these medications Instructions Each Dose to Equal  
 Morning Noon Evening Bedtime  
 metFORMIN 500 mg tablet Commonly known as:  GLUCOPHAGE What changed:  additional instructions Your last dose was: Your next dose is: Take 1 Tab by mouth two (2) times daily (with meals) for 30 days. Restart Metformin on , 18  
 500 mg CONTINUE taking these medications  Instructions Each Dose to Equal  
 Morning Noon Evening Bedtime  
 aspirin delayed-release 81 mg tablet Your last dose was: Your next dose is: Take 1 Tab by mouth daily. 81 mg Blood-Glucose Meter monitoring kit Your last dose was: Your next dose is:    
   
   
 Use as directed  
     
   
   
   
  
 glucose blood VI test strips strip Commonly known as:  blood glucose test  
   
Your last dose was: Your next dose is:    
   
   
 Use as directed  
     
   
   
   
  
 metoprolol tartrate 25 mg tablet Commonly known as:  LOPRESSOR Your last dose was: Your next dose is: Take 1 Tab by mouth two (2) times a day. 25 mg  
    
   
   
   
  
 naproxen 500 mg tablet Commonly known as:  NAPROSYN Your last dose was: Your next dose is: Take 1 Tab by mouth every twelve (12) hours as needed for Pain. 500 mg  
    
   
   
   
  
 nitroglycerin 0.4 mg SL tablet Commonly known as:  NITROSTAT Your last dose was: Your next dose is:    
   
   
 1 Tab by SubLINGual route every five (5) minutes as needed (call 911 if not relieved by 3). 0.4 mg Where to Get Your Medications These medications were sent to [address] [place] [address] [address] [place] [address] [address] [place] [address] Hours:  24-hours Phone:  [phone]  
  metFORMIN 500 mg tablet  
 raNITIdine hcl 150 mg capsule  
 simvastatin 40 mg tablet Discharge Instructions [address]  [phone] Patient ID: 
Drea Mo [id number] 
89 y.o. 
1975 Admit Date: 9/28/2018 Discharge Date: 9/28/2018 Admitting Physician: Ashly Elizabeth MD  
 
Discharge Physician: Maryan Chahal NP Admission Diagnoses:  
cath [facility] Discharge Diagnoses:  
Principal Problem: 
  Angina, class III ([address] (9/28/2018) Active Problems: 
  Abnormal nuclear cardiac imaging test (9/28/2018) Diabetes mellitus type 2, controlled ([address] (9/28/2018) S/P cardiac cath (9/28/2018) Overview: 9/28/18 cardiac cath with no significant disease Discharge Condition: Good Cardiology Procedures this Admission:  Diagnostic left heart catheterization Disposition: home Reference discharge instructions provided by nursing for diet and activity. Signed: 
Thiago Verma NP 
9/28/2018 10:38 AM 
 
 
Radial Cardiac Catheterization/Angiography Discharge Instructions It is normal to feel tired the first couple days. Take it easy and follow the physicians instructions. CHECK THE CATHETER INSERTION SITE DAILY: 
 
Remove the wrist dressing 24 hours after the procedure. You may shower 24 hours after the procedure. Wash with soap and water and pat dry. Gentle cleaning of the site with soap and water is sufficient, cover with a dry clean dressing or bandage. Do not apply creams or powders to the area. No soaking the wrist for 3 days. Leave the puncture site open to air after 24 hours post-procedure. CALL THE PHYSICIANS:  
 
If the site becomes red, swollen or feels warm to the touch If there is bleeding or drainage or if there is unusual pain at the radial site. If there is any minor oozing, you may apply a band-aid and remove after 12 hours. If the bleeding continues, hold pressure with the middle finger against the puncture site and the thumb against the back of the wrist,call 911 to be transported to the hospital. 
DO NOT DRIVE YOURSELF, [facility]. ACTIVITY:  
For the first 24 hours do not manipulate the wrist. 
No lifting, pushing or pulling over 3-5 pounds with the affected wrist for 7 daysand no straining the insertion site.  Do not life grocery bags or the garbage can, do not run the vacuum  or  for 7 days. Start with short walks as in the hospital and gradually increase as tolerated each day. It is recommended to walk 30 minutes 5-7 days per week. Follow your physicians instructions on activity. Avoid walking outside in extremes of heat or cold. Walk inside when it is cold and windy or hot and humid. Things to keep in mind: 
No driving for at least 24 hours, or as designated by your physician. Limit the number of times you go up and down the stairs Take rests and pace yourself with activity. Be careful and do not strain with bowel movements. MEDICATIONS: 
 
Take all medications as prescribed Call your physician if you have any questions Keep an updated list of your medications with you at all times and give a list to your physician and pharmacist 
 
SIGNS AND SYMPTOMS:  
Be cautious of symptoms of angina or recurrent symptoms such as chest discomfort, unusual shortness of breath or fatigue. These could be symptoms of restenosis, a new blockage or a heart attack. If your symptoms are relieved with rest it is still recommended that you notify your physician of recurrent chest pain or discomfort. For CHEST PAIN or symptoms of angina not relieved with rest:  If the discomfort is not relieved with rest, and you have been prescribed Nitroglycerin, take as directed (taken under the tongue, one at a time 5 minutes apart for a total of 3 doses). If the discomfort is not relieved after the 3rd nitroglycerin, call 911. If you have not been prescribed Nitroglycerin  and your chest discomfort is not relieved with rest, call 911. AFTER CARE:  
Follow up with your physician as instructed. Follow a heart healthy diet with proper portion control, daily stress management, daily exercise, blood pressure and cholesterol control , and smoking cessation. Introducing hospitals & HEALTH SERVICES! Dear Alla De Oliveira: Thank you for requesting a [employer] account. Our records indicate that you already have an active [employer] account. You can access your account anytime at [email or web address] Did you know that you can access your hospital and ER discharge instructions at any time in [employer]? You can also review all of your test results from your hospital stay or ER visit. Additional Information If you have questions, please visit the Frequently Asked Questions section of the [employer] website at [email or web address] Remember, [employer] is NOT to be used for urgent needs. For medical emergencies, dial 911. Now available from your iPhone and Android! Introducing Antione Peck As a [employer] patient, I wanted to make you aware of our electronic visit tool called Antione Peck. [employer] 24/7 allows you to connect within minutes with a medical provider 24 hours a day, seven days a week via a mobile device or tablet or logging into a secure website from your computer. You can access Antione Guzmanfin from anywhere in the [place]. A virtual visit might be right for you when you have a simple condition and feel like you just dont want to get out of bed, or cant get away from work for an appointment, when your regular [employer] provider is not available (evenings, weekends or holidays), or when youre out of town and need minor care. Electronic visits cost only $49 and if the [employer] 24/7 provider determines a prescription is needed to treat your condition, one can be electronically transmitted to a nearby pharmacy*. Please take a moment to enroll today if you have not already done so. The enrollment process is free and takes just a few minutes. To enroll, please download the [employer] 24/7 miguelangel to your tablet or phone, or visit [email or web address] to enroll on your computer. And, as an [address] [place] [address] patient with a [employer] account, the results of your visits will be scanned into your electronic medical record and your primary care provider will be able to view the scanned results. We urge you to continue to see your regular [address] [place] [address] provider for your ongoing medical care. And while your primary care provider may not be the one available when you seek a Antione Guzmanfin virtual visit, the peace of mind you get from getting a real diagnosis real time can be priceless. For more information on Antione Guzmanfin, view our Frequently Asked Questions (FAQs) at [email or web address]. Sincerely, 
 
Carlos Rushing MD 
Chief Medical Officer [employer] *:  certain medications cannot be prescribed via [employer] Providers Seen During Your Hospitalization Provider Specialty Primary office phone Rodrick Arora MD Cardiology [phone] Your Primary Care Physician (PCP) Primary Care Physician Office Phone Office Fax NONE ** None ** ** None ** You are allergic to the following Allergen Reactions Imdur (Isosorbide Mononitrate) Other (comments) Severe headache Recent Documentation Height Weight BMI Smoking Status 1.778 m 119.7 kg 37.88 kg/m2 Former Smoker Emergency Contacts Name Discharge Info Relation Home Work Mobile [address] [facility] CAREGIVER [3] Girlfriend [18] [phone] Royetta Half [5] [phone] Patient Belongings The following personal items are in your possession at time of discharge: 
  Dental Appliances: None  Visual Aid: None      Home Medications: None   Jewelry: None  Clothing: At bedside    Other Valuables: None Please provide this summary of care documentation to your next provider.  
  
  
 
  
Signatures-by signing, you are acknowledging that this After Visit Summary has been reviewed with you and you have received a copy. Patient Signature:  ____________________________________________________________ Date:  ____________________________________________________________  
  
Celester Rota Provider Signature:  ____________________________________________________________ Date:  ____________________________________________________________

## 2018-09-28 NOTE — PROGRESS NOTES
TRANSFER - IN REPORT: 
 
Verbal report received from Mani (name) on Marleny Merritt  being received from Cath Lab (unit) for routine post - op Report consisted of patients Situation, Background, Assessment and  
Recommendations(SBAR). Information from the following report(s) Procedure Summary, Intake/Output and Cardiac Rhythm NSR was reviewed with the receiving nurse. Opportunity for questions and clarification was provided. Assessment completed upon patients arrival to unit and care assumed. +1 Edema noted to right hand. Pulses palpable and capillary refill brisk. [place] notified and in room to assess patient. Discharge instructions reviewed with patient and family. Allowed adequate time to ask questions, all questions answered, provided information for additional questions. Printed copy of AVS given to patient. All belongings gathered, IV and tele discontinued. Transported via wheelchair by volunteer to main entrance and into care of family.

## 2018-09-28 NOTE — DISCHARGE INSTRUCTIONS
[address]  [phone]        Patient ID:  Aba Pang  [id number]  41 y.o.  1975    Admit Date: 9/28/2018    Discharge Date: 9/28/2018     Admitting Physician: Candi Zarate MD     Discharge Physician: Tiffany Fernandez NP    Admission Diagnoses:   cath  Angina, class III [facility])    Discharge Diagnoses:   Principal Problem:    Angina, class III ([address] (9/28/2018)    Active Problems:    Abnormal nuclear cardiac imaging test (9/28/2018)      Diabetes mellitus type 2, controlled ([address] (9/28/2018)      S/P cardiac cath (9/28/2018)      Overview: 9/28/18 cardiac cath with no significant disease        Discharge Condition: Good    Cardiology Procedures this Admission:  Diagnostic left heart catheterization    Disposition: home    Reference discharge instructions provided by nursing for diet and activity. Signed:  Tiffany Fernandez NP  9/28/2018  10:38 AM      Radial Cardiac Catheterization/Angiography Discharge Instructions    It is normal to feel tired the first couple days. Take it easy and follow the physicians instructions. CHECK THE CATHETER INSERTION SITE DAILY:    Remove the wrist dressing 24 hours after the procedure. You may shower 24 hours after the procedure. Wash with soap and water and pat dry. Gentle cleaning of the site with soap and water is sufficient, cover with a dry clean dressing or bandage. Do not apply creams or powders to the area. No soaking the wrist for 3 days. Leave the puncture site open to air after 24 hours post-procedure. CALL THE PHYSICIANS:     If the site becomes red, swollen or feels warm to the touch  If there is bleeding or drainage or if there is unusual pain at the radial site. If there is any minor oozing, you may apply a band-aid and remove after 12 hours.    If the bleeding continues, hold pressure with the middle finger against the puncture site and the thumb against the back of the wrist,call 911 to be transported to the hospital.  DO NOT DRIVE YOURSELF, OR HAVE ANYONE ELSE DRIVE YOU - CALL 903. ACTIVITY:   For the first 24 hours do not manipulate the wrist.  No lifting, pushing or pulling over 3-5 pounds with the affected wrist for 7 daysand no straining the insertion site. Do not life grocery bags or the garbage can, do not run the vacuum  or  for 7 days. Start with short walks as in the hospital and gradually increase as tolerated each day. It is recommended to walk 30 minutes 5-7 days per week. Follow your physicians instructions on activity. Avoid walking outside in extremes of heat or cold. Walk inside when it is cold and windy or hot and humid. Things to keep in mind:  No driving for at least 24 hours, or as designated by your physician. Limit the number of times you go up and down the stairs  Take rests and pace yourself with activity. Be careful and do not strain with bowel movements. MEDICATIONS:    Take all medications as prescribed  Call your physician if you have any questions  Keep an updated list of your medications with you at all times and give a list to your physician and pharmacist    SIGNS AND SYMPTOMS:   Be cautious of symptoms of angina or recurrent symptoms such as chest discomfort, unusual shortness of breath or fatigue. These could be symptoms of restenosis, a new blockage or a heart attack. If your symptoms are relieved with rest it is still recommended that you notify your physician of recurrent chest pain or discomfort. For CHEST PAIN or symptoms of angina not relieved with rest:  If the discomfort is not relieved with rest, and you have been prescribed Nitroglycerin, take as directed (taken under the tongue, one at a time 5 minutes apart for a total of 3 doses). If the discomfort is not relieved after the 3rd nitroglycerin, call 911.   If you have not been prescribed Nitroglycerin  and your chest discomfort is not relieved with rest, call 911. AFTER CARE:   Follow up with your physician as instructed. Follow a heart healthy diet with proper portion control, daily stress management, daily exercise, blood pressure and cholesterol control , and smoking cessation.

## 2018-09-28 NOTE — PROGRESS NOTES
Office H&P noted, reviewed. Re-examined today and interval change is to specify that I discussed Mr. Nieves abnormal stress test with him, concerning for myocardial ischemia. He continues to have intermittent chest pressure at rest, none bad enough to require sublingual nitroglycerin. I discussed the risks and benefits of cardiac catheterization plus or minus PCI with the patient expressed understanding and wishes to proceed.

## 2018-10-03 ENCOUNTER — OFFICE VISIT (OUTPATIENT)
Dept: FAMILY MEDICINE CLINIC | Age: 43
End: 2018-10-03

## 2018-10-03 VITALS
BODY MASS INDEX: 39.22 KG/M2 | DIASTOLIC BLOOD PRESSURE: 74 MMHG | HEIGHT: 70 IN | OXYGEN SATURATION: 98 % | RESPIRATION RATE: 20 BRPM | HEART RATE: 67 BPM | TEMPERATURE: 97.6 F | WEIGHT: 274 LBS | SYSTOLIC BLOOD PRESSURE: 124 MMHG

## 2018-10-03 DIAGNOSIS — E11.9 DIABETIC EYE EXAM (HCC): ICD-10-CM

## 2018-10-03 DIAGNOSIS — J20.9 ACUTE BRONCHITIS DUE TO INFECTION: ICD-10-CM

## 2018-10-03 DIAGNOSIS — E55.9 VITAMIN D DEFICIENCY: ICD-10-CM

## 2018-10-03 DIAGNOSIS — E78.00 HYPERCHOLESTEROLEMIA: ICD-10-CM

## 2018-10-03 DIAGNOSIS — E78.5 DYSLIPIDEMIA (HIGH LDL; LOW HDL): ICD-10-CM

## 2018-10-03 DIAGNOSIS — E11.49 DIABETES MELLITUS TYPE 2 WITH NEUROLOGICAL MANIFESTATIONS (HCC): ICD-10-CM

## 2018-10-03 DIAGNOSIS — Z13.29 SCREENING FOR THYROID DISORDER: ICD-10-CM

## 2018-10-03 DIAGNOSIS — Z01.00 DIABETIC EYE EXAM (HCC): ICD-10-CM

## 2018-10-03 DIAGNOSIS — I10 HYPERTENSION, WELL CONTROLLED: Primary | ICD-10-CM

## 2018-10-03 LAB
GLUCOSE POC: 168 MG/DL
HBA1C MFR BLD HPLC: 11.5 %

## 2018-10-03 RX ORDER — METFORMIN HYDROCHLORIDE 1000 MG/1
1000 TABLET ORAL 2 TIMES DAILY WITH MEALS
Qty: 60 TAB | Refills: 3 | Status: SHIPPED | OUTPATIENT
Start: 2018-10-03 | End: 2019-04-23 | Stop reason: SDUPTHER

## 2018-10-03 RX ORDER — AMOXICILLIN 500 MG/1
500 CAPSULE ORAL 2 TIMES DAILY
Qty: 14 CAP | Refills: 0 | Status: SHIPPED | OUTPATIENT
Start: 2018-10-03 | End: 2018-10-10

## 2018-10-03 NOTE — PROGRESS NOTES
Chief Complaint Patient presents with  New Patient  
  hospital follow up  Cold Symptoms HPI Rangel Austin is a 40 y.o.  male presents as a hospital follow up with newly diagnosed diabetes type 2. Patient was started on metformin  mg. BS in clinic today is and A1C=11.5% Other chronic medical issue include hypertension, obesity. Review of Systems Constitutional: negative for fevers, chills, positive weight gain Eyes:   negative for visual disturbance ENT:   negative for tinnitus,sore throat,nasal congestion,ear pains. hoarseness Respiratory:  negative for cough, dyspnea,wheezing CV:   negative for chest pain, palpitations, lower extremity edema GI:   negative for nausea/vomiting, abdominal pain Endo:               negative for polyuria,polydipsia,polyphagia Genitourinary: negative for frequency, dysuria and hematuria Musculoskel: positive for pain in feet Neurological:  negative for headaches, dizziness, memory problems Behavl/Psych: negative for feelings of anxiety, depression, mood changes Past Medical History:  
Diagnosis Date  Abnormal nuclear cardiac imaging test 9/28/2018  Diabetes mellitus type 2, controlled ([facility] [address] 9/28/2018  Hypercholesterolemia  Hypertension  S/P cardiac cath 9/28/2018 9/28/18 cardiac cath with no significant disease History reviewed. No pertinent surgical history. Social History Social History  Marital status: SINGLE Spouse name: N/A  
 Number of children: N/A  
 Years of education: N/A Social History Main Topics  Smoking status: Former Smoker Types: Cigarettes Quit date: 9/6/2014  Smokeless tobacco: Current User Types: Chew  Alcohol use Yes Comment: occasionally  Drug use: No  
 Sexual activity: Not Asked Other Topics Concern  None Social History Narrative Family History Problem Relation Age of Onset  Heart Disease Father 48 Current Outpatient Prescriptions Medication Sig Dispense Refill  metFORMIN (GLUCOPHAGE) 500 mg tablet Take 1 Tab by mouth two (2) times daily (with meals) for 30 days. Restart Metformin on Sunday, 9/30/18 60 Tab 0  
 simvastatin (ZOCOR) 40 mg tablet Take 1 Tab by mouth nightly. 30 Tab 11  
 raNITIdine hcl 150 mg capsule Take 150 mg by mouth two (2) times a day. 60 Cap 1  
 glucose blood VI test strips (BLOOD GLUCOSE TEST) strip Use as directed 100 Strip 0  Blood-Glucose Meter monitoring kit Use as directed (Patient taking differently: three (3) times daily. Use as directed   freestyle meter) 1 Kit 0  
 aspirin delayed-release 81 mg tablet Take 1 Tab by mouth daily. 30 Tab 11  
 metoprolol tartrate (LOPRESSOR) 25 mg tablet Take 1 Tab by mouth two (2) times a day. 180 Tab 3  
 nitroglycerin (NITROSTAT) 0.4 mg SL tablet 1 Tab by SubLINGual route every five (5) minutes as needed (call 911 if not relieved by 3). 25 Tab 1  
 naproxen (NAPROSYN) 500 mg tablet Take 1 Tab by mouth every twelve (12) hours as needed for Pain. 20 Tab 0 Allergies Allergen Reactions  Imdur [Isosorbide Mononitrate] Other (comments) Severe headache Objective: 
Visit Vitals  /74  Pulse 67  Temp 97.6 °F (36.4 °C) (Oral)  Resp 20  
 Ht 5' 10\" (1.778 m)  Wt 274 lb (124.3 kg)  SpO2 98%  BMI 39.31 kg/m2 Physical Exam:  
General appearance - alert, well appearing in no distress EYE-PERRL, EOMI Neck - supple, no significant adenopathy Chest - clear to auscultation, no wheezes, rales or rhonchi Heart - normal rate, regular rhythm, normal blood pressure Abdomen - soft, nontender, nondistended, no organomegaly Ext-peripheral pulses normal, no pedal edema Neuro -alert, oriented, normal speech, no focal findings Back-full range of motion, no tenderness, palpable spasm or pain on motion No results found for this or any previous visit (from the past 12 hour(s)). Assessment/Plan: Diagnoses and all orders for this visit: Hypertension, well controlled -     METABOLIC PANEL, COMPREHENSIVE Hypercholesterolemia Vitamin D deficiency 
-     VITAMIN D, 25 HYDROXY Diabetic eye exam ([facility] [address] 
-     REFERRAL TO OPHTHALMOLOGY Diabetes mellitus type 2 with neurological manifestations ([facility] -     AMB POC GLUCOSE BLOOD, BY GLUCOSE MONITORING DEVICE 
-     AMB POC HEMOGLOBIN A1C 
-     MICROALBUMIN, UR, RAND W/ MICROALB/CREAT RATIO 
-     METABOLIC PANEL, COMPREHENSIVE 
-     metFORMIN (GLUCOPHAGE) 1,000 mg tablet; Take 1 Tab by mouth two (2) times daily (with meals). Indications: type 2 diabetes mellitus, Normal, Disp-60 Tab, R-3 
-     REFERRAL TO PHARMACIST Dyslipidemia (high LDL; low HDL) Screening for thyroid disorder 
-     TSH 3RD GENERATION Acute bronchitis due to infection 
-     amoxicillin (AMOXIL) 500 mg capsule; Take 1 Cap by mouth two (2) times a day for 7 days. , Normal, Disp-14 Cap, R-0 Patient Instructions Blood Glucose: About This Test 
What is it? A blood glucose test measures the amount of a type of sugar, called glucose, in your blood. Several different types of blood glucose tests are used. · Fasting blood sugar (FBS) measures blood glucose after you have not eaten for at least 8 hours. It is often the first test done to check for prediabetes and diabetes. · Random blood sugar (RBS) measures blood glucose regardless of when you last ate. · Oral glucose tolerance test (OGTT) may be used to diagnose prediabetes and diabetes. This test is a series of blood glucose measurements taken after you drink a sweet liquid that contains glucose. This test is mostly used for pregnant women to check for gestational diabetes. Why is this test done? Blood glucose tests are done to: · Check for diabetes. · See how well treatment for diabetes is working. The A1c is a different type of blood test used to diagnose diabetes.  
How can you prepare for the test? 
 Be sure to tell your doctor about all the nonprescription and prescription medicines and herbs or other supplements you take. There are many medicines and supplements that can affect the results of these tests. Fasting blood sugar (FBS) For a fasting blood sugar test, do not eat or drink anything other than water for at least 8 hours before the blood sample is taken. If you have diabetes, you may be asked to wait until you have had your blood tested before taking your morning dose of insulin or diabetes medicine. Random blood sugar (RBS) No special preparation is needed before having a random blood sugar test. 
Oral glucose tolerance test 
In the 3 days before this test, eat the way you are used to eating. In general, this means a well-balanced diet that contains at least 150 grams of carbohydrate a day. Good foods to eat are fruits, breads, cereals, grains, rice, crackers, potatoes, beans, and corn. In the 8 hours right before the test, do not eat or drink anything (except water). Do not smoke, drink alcohol, or exercise. What happens during the test? 
A health professional takes a sample of your blood. For a home glucose test, you may have a blood sample taken from your fingertip. This is done by: · Pricking your finger with a small needle (lancet) to collect a drop of blood. · Placing the blood on a special test strip. · Putting the test strip into a device called a blood glucose meter. For the oral glucose tolerance test: · A blood sample will be taken when you arrive. This is your fasting blood glucose value. It will be compared with other samples during the day. · You will drink a sweet glucose liquid. · Depending on the reason for this test, blood samples may be taken 1, 2, or 3 hours after you drink the glucose. Blood samples may also be taken as soon as 30 minutes after you drink the glucose. When should you call for help? Watch closely for changes in your health, and be sure to contact your doctor if you have any problems. Follow-up care is a key part of your treatment and safety. Be sure to make and go to all appointments, and call your doctor if you are having problems. It's also a good idea to keep a list of the medicines you take. Ask your doctor when you can expect to have your test results. Where can you learn more? Go to http://torito-kiki.info/. Enter G279 in the search box to learn more about \"Blood Glucose: About This Test.\" Current as of: December 7, 2017 Content Version: 11.7 © 7266-5183 [employer]. Care instructions adapted under license by [employer] (which disclaims liability or warranty for this information). If you have questions about a medical condition or this instruction, always ask your healthcare professional. [address] any warranty or liability for your use of this information. Follow-up Disposition: 
Return 4-6 weeks, for f/u DM treatment.

## 2018-10-03 NOTE — PATIENT INSTRUCTIONS
Blood Glucose: About This Test 
What is it? A blood glucose test measures the amount of a type of sugar, called glucose, in your blood. Several different types of blood glucose tests are used. · Fasting blood sugar (FBS) measures blood glucose after you have not eaten for at least 8 hours. It is often the first test done to check for prediabetes and diabetes. · Random blood sugar (RBS) measures blood glucose regardless of when you last ate. · Oral glucose tolerance test (OGTT) may be used to diagnose prediabetes and diabetes. This test is a series of blood glucose measurements taken after you drink a sweet liquid that contains glucose. This test is mostly used for pregnant women to check for gestational diabetes. Why is this test done? Blood glucose tests are done to: · Check for diabetes. · See how well treatment for diabetes is working. The A1c is a different type of blood test used to diagnose diabetes. How can you prepare for the test? 
Be sure to tell your doctor about all the nonprescription and prescription medicines and herbs or other supplements you take. There are many medicines and supplements that can affect the results of these tests. Fasting blood sugar (FBS) For a fasting blood sugar test, do not eat or drink anything other than water for at least 8 hours before the blood sample is taken. If you have diabetes, you may be asked to wait until you have had your blood tested before taking your morning dose of insulin or diabetes medicine. Random blood sugar (RBS) No special preparation is needed before having a random blood sugar test. 
Oral glucose tolerance test 
In the 3 days before this test, eat the way you are used to eating. In general, this means a well-balanced diet that contains at least 150 grams of carbohydrate a day. Good foods to eat are fruits, breads, cereals, grains, rice, crackers, potatoes, beans, and corn. In the 8 hours right before the test, do not eat or drink anything (except water). Do not smoke, drink alcohol, or exercise. What happens during the test? 
A health professional takes a sample of your blood. For a home glucose test, you may have a blood sample taken from your fingertip. This is done by: · Pricking your finger with a small needle (lancet) to collect a drop of blood. · Placing the blood on a special test strip. · Putting the test strip into a device called a blood glucose meter. For the oral glucose tolerance test: · A blood sample will be taken when you arrive. This is your fasting blood glucose value. It will be compared with other samples during the day. · You will drink a sweet glucose liquid. · Depending on the reason for this test, blood samples may be taken 1, 2, or 3 hours after you drink the glucose. Blood samples may also be taken as soon as 30 minutes after you drink the glucose. When should you call for help? Watch closely for changes in your health, and be sure to contact your doctor if you have any problems. Follow-up care is a key part of your treatment and safety. Be sure to make and go to all appointments, and call your doctor if you are having problems. It's also a good idea to keep a list of the medicines you take. Ask your doctor when you can expect to have your test results. Where can you learn more? Go to http://torito-kiki.info/. Enter G279 in the search box to learn more about \"Blood Glucose: About This Test.\" Current as of: December 7, 2017 Content Version: 11.7 © 4781-8852 Healthwise, Incorporated. Care instructions adapted under license by [employer] (which disclaims liability or warranty for this information). If you have questions about a medical condition or this instruction, always ask your healthcare professional. [address] any warranty or liability for your use of this information.

## 2018-10-03 NOTE — MR AVS SNAPSHOT
[address] [facility] 
[phone] Patient: Mariama Cuevas MRN: [id number] :1975 Visit Information Date & Time Provider Department Dept. Phone Encounter #  
 10/3/2018 10:45 AM Jackson Garber MD [facility] at [address] [id number] Follow-up Instructions Return 4-6 weeks, for f/u DM treatment. Your Appointments 10/3/2018  3:30 PM  
ESTABLISHED PATIENT with Marykay Closs, MD  
[facility]) Appt Note: s/p hosptialization per Sherly Corona [id number] [facility]  
[phone] [address] [facility] Upcoming Health Maintenance Date Due HEMOGLOBIN A1C Q6M 1975 FOOT EXAM Q1 11/10/1986 MICROALBUMIN Q1 11/10/1986 EYE EXAM RETINAL OR DILATED Q1 11/10/1986 Pneumococcal 19-64 Medium Risk (1 of 1 - PPSV23) 11/10/1995 DTaP/Tdap/Td series (1 - Tdap) 11/10/1997 Influenza Age 5 to Adult 2018 LIPID PANEL Q1 2019 Allergies as of 10/3/2018  Review Complete On: 10/3/2018 By: Jackson Garber MD  
  
 Severity Noted Reaction Type Reactions Imdur [Isosorbide Mononitrate]  2018    Other (comments) Severe headache Current Immunizations  Never Reviewed No immunizations on file. Not reviewed this visit You Were Diagnosed With   
  
 Codes Comments Hypertension, well controlled    -  Primary ICD-10-CM: I10 
ICD-9-CM: 401.9 Hypercholesterolemia     ICD-10-CM: E78.00 ICD-9-CM: 272.0 Vitamin D deficiency     ICD-10-CM: E55.9 ICD-9-CM: 268.9 Diabetic eye exam ([facility] [address]     ICD-10-CM: Z01.00, E11.9 ICD-9-CM: V72.0, 250.00 Diabetes mellitus type 2 with neurological manifestations ([facility] [address]     ICD-10-CM: E11.49 
ICD-9-CM: 250.60 Dyslipidemia (high LDL; low HDL)     ICD-10-CM: E78.5 ICD-9-CM: 272.4 Screening for thyroid disorder     ICD-10-CM: Z13.29 ICD-9-CM: V77.0 Acute bronchitis due to infection     ICD-10-CM: J20.8 ICD-9-CM: 466.0 Vitals BP Pulse Temp Resp Height(growth percentile) Weight(growth percentile) 124/74 67 97.6 °F (36.4 °C) (Oral) 20 5' 10\" (1.778 m) 274 lb (124.3 kg) SpO2 BMI Smoking Status 98% 39.31 kg/m2 Former Smoker Vitals History BMI and BSA Data Body Mass Index Body Surface Area  
 39.31 kg/m 2 2.48 m 2 Preferred Pharmacy Pharmacy Name Phone [id number]- [place], [place] - [address] [phone] Your Updated Medication List  
  
   
This list is accurate as of 10/3/18 12:27 PM.  Always use your most recent med list.  
  
  
  
  
 amoxicillin 500 mg capsule Commonly known as:  AMOXIL Take 1 Cap by mouth two (2) times a day for 7 days. aspirin delayed-release 81 mg tablet Take 1 Tab by mouth daily. Blood-Glucose Meter monitoring kit Use as directed  
  
 glucose blood VI test strips strip Commonly known as:  blood glucose test  
Use as directed  
  
 metFORMIN 1,000 mg tablet Commonly known as:  GLUCOPHAGE Take 1 Tab by mouth two (2) times daily (with meals). Indications: type 2 diabetes mellitus  
  
 metoprolol tartrate 25 mg tablet Commonly known as:  LOPRESSOR Take 1 Tab by mouth two (2) times a day. naproxen 500 mg tablet Commonly known as:  NAPROSYN Take 1 Tab by mouth every twelve (12) hours as needed for Pain. nitroglycerin 0.4 mg SL tablet Commonly known as:  NITROSTAT  
1 Tab by SubLINGual route every five (5) minutes as needed (call 911 if not relieved by 3). raNITIdine hcl 150 mg capsule Take 150 mg by mouth two (2) times a day. simvastatin 40 mg tablet Commonly known as:  ZOCOR Take 1 Tab by mouth nightly. Prescriptions Sent to Pharmacy Refills metFORMIN (GLUCOPHAGE) 1,000 mg tablet 3 Sig: Take 1 Tab by mouth two (2) times daily (with meals). Indications: type 2 diabetes mellitus Class: Normal  
 Pharmacy: [facility] [id number] [email or web address]  "[employer] [address] Ph #: [phone] Route: Oral  
 amoxicillin (AMOXIL) 500 mg capsule 0 Sig: Take 1 Cap by mouth two (2) times a day for 7 days. Class: Normal  
 Pharmacy: [facility]pharmacy [id number] [email or web address]  "[employer] [address] Ph #: [phone] Route: Oral  
  
We Performed the Following AMB POC GLUCOSE BLOOD, BY GLUCOSE MONITORING DEVICE [22123 CPT(R)] AMB POC HEMOGLOBIN A1C [18391 CPT(R)] METABOLIC PANEL, COMPREHENSIVE [01444 CPT(R)] MICROALBUMIN, UR, RAND W/ MICROALB/CREAT RATIO [id number] CPT(R)] REFERRAL TO OPHTHALMOLOGY [REF57 Custom] Comments:  
 Please evaluate patient for diabetic eye exam  
 REFERRAL TO PHARMACIST [[id number] Custom] TSH 3RD GENERATION [94647 CPT(R)] VITAMIN D, 25 HYDROXY [id number] CPT(R)] Follow-up Instructions Return 4-6 weeks, for f/u DM treatment. Referral Information Referral ID Referred By Referred To  
  
 [id number] [facility], [address] [place] [address] Phone: [phone] Fax: [phone] Visits Status Start Date End Date 1 New Request 10/3/18 10/3/19 If your referral has a status of pending review or denied, additional information will be sent to support the outcome of this decision. Referral ID Referred By Referred To  
 [id number] [facility], SANDY Tripp, [address] [place] [address] Phone: [phone] Fax: [phone] Visits Status Start Date End Date 1 New Request 10/3/18 10/3/19  If your referral has a status of pending review or denied, additional information will be sent to support the outcome of this decision. Patient Instructions Blood Glucose: About This Test 
What is it? A blood glucose test measures the amount of a type of sugar, called glucose, in your blood. Several different types of blood glucose tests are used. · Fasting blood sugar (FBS) measures blood glucose after you have not eaten for at least 8 hours. It is often the first test done to check for prediabetes and diabetes. · Random blood sugar (RBS) measures blood glucose regardless of when you last ate. · Oral glucose tolerance test (OGTT) may be used to diagnose prediabetes and diabetes. This test is a series of blood glucose measurements taken after you drink a sweet liquid that contains glucose. This test is mostly used for pregnant women to check for gestational diabetes. Why is this test done? Blood glucose tests are done to: · Check for diabetes. · See how well treatment for diabetes is working. The A1c is a different type of blood test used to diagnose diabetes. How can you prepare for the test? 
Be sure to tell your doctor about all the nonprescription and prescription medicines and herbs or other supplements you take. There are many medicines and supplements that can affect the results of these tests. Fasting blood sugar (FBS) For a fasting blood sugar test, do not eat or drink anything other than water for at least 8 hours before the blood sample is taken. If you have diabetes, you may be asked to wait until you have had your blood tested before taking your morning dose of insulin or diabetes medicine. Random blood sugar (RBS) No special preparation is needed before having a random blood sugar test. 
Oral glucose tolerance test 
In the 3 days before this test, eat the way you are used to eating. In general, this means a well-balanced diet that contains at least 150 grams of carbohydrate a day.  Good foods to eat are fruits, breads, cereals, grains, rice, crackers, potatoes, beans, and corn. In the 8 hours right before the test, do not eat or drink anything (except water). Do not smoke, drink alcohol, or exercise. What happens during the test? 
A health professional takes a sample of your blood. For a home glucose test, you may have a blood sample taken from your fingertip. This is done by: · Pricking your finger with a small needle (lancet) to collect a drop of blood. · Placing the blood on a special test strip. · Putting the test strip into a device called a blood glucose meter. For the oral glucose tolerance test: · A blood sample will be taken when you arrive. This is your fasting blood glucose value. It will be compared with other samples during the day. · You will drink a sweet glucose liquid. · Depending on the reason for this test, blood samples may be taken 1, 2, or 3 hours after you drink the glucose. Blood samples may also be taken as soon as 30 minutes after you drink the glucose. When should you call for help? Watch closely for changes in your health, and be sure to contact your doctor if you have any problems. Follow-up care is a key part of your treatment and safety. Be sure to make and go to all appointments, and call your doctor if you are having problems. It's also a good idea to keep a list of the medicines you take. Ask your doctor when you can expect to have your test results. Where can you learn more? Go to http://torito-kiki.info/. Enter G279 in the search box to learn more about \"Blood Glucose: About This Test.\" Current as of: December 7, 2017 Content Version: 11.7 © 0333-1490 Healthwise, Incorporated. Care instructions adapted under license by [employer] (which disclaims liability or warranty for this information).  If you have questions about a medical condition or this instruction, always ask your healthcare professional. Larissa Velazquez [employer] disclaims any warranty or liability for your use of this information. Introducing [facility]! Dear Colt Campbell: Thank you for requesting a Runteq account. Our records indicate that you already have an active Runteq account. You can access your account anytime at [email or web address]"[email or web address]. [employer] Did you know that you can access your hospital and ER discharge instructions at any time in Runteq? You can also review all of your test results from your hospital stay or ER visit. Additional Information If you have questions, please visit the Frequently Asked Questions section of the Runteq website at [email or web address]/. Remember, Runteq is NOT to be used for urgent needs. For medical emergencies, dial 911. Now available from your iPhone and Android! Please provide this summary of care documentation to your next provider. Your primary care clinician is listed as NONE. If you have any questions after today's visit, please call [phone].

## 2019-04-23 ENCOUNTER — OFFICE VISIT (OUTPATIENT)
Dept: FAMILY MEDICINE CLINIC | Age: 44
End: 2019-04-23

## 2019-04-23 VITALS
WEIGHT: 276 LBS | TEMPERATURE: 98.8 F | BODY MASS INDEX: 39.51 KG/M2 | RESPIRATION RATE: 20 BRPM | HEART RATE: 83 BPM | SYSTOLIC BLOOD PRESSURE: 121 MMHG | OXYGEN SATURATION: 98 % | DIASTOLIC BLOOD PRESSURE: 74 MMHG | HEIGHT: 70 IN

## 2019-04-23 DIAGNOSIS — E11.9 ENCOUNTER FOR DIABETIC FOOT EXAM (HCC): ICD-10-CM

## 2019-04-23 DIAGNOSIS — E78.5 DYSLIPIDEMIA (HIGH LDL; LOW HDL): ICD-10-CM

## 2019-04-23 DIAGNOSIS — I10 HYPERTENSION, WELL CONTROLLED: Primary | ICD-10-CM

## 2019-04-23 DIAGNOSIS — Z01.00 DIABETIC EYE EXAM (HCC): ICD-10-CM

## 2019-04-23 DIAGNOSIS — Z23 ENCOUNTER FOR IMMUNIZATION: ICD-10-CM

## 2019-04-23 DIAGNOSIS — E11.9 DIABETIC EYE EXAM (HCC): ICD-10-CM

## 2019-04-23 DIAGNOSIS — E55.9 VITAMIN D DEFICIENCY: ICD-10-CM

## 2019-04-23 DIAGNOSIS — E11.49 DIABETES MELLITUS TYPE 2 WITH NEUROLOGICAL MANIFESTATIONS (HCC): ICD-10-CM

## 2019-04-23 DIAGNOSIS — K21.9 GASTROESOPHAGEAL REFLUX DISEASE WITHOUT ESOPHAGITIS: ICD-10-CM

## 2019-04-23 DIAGNOSIS — R35.0 FREQUENCY OF URINATION: ICD-10-CM

## 2019-04-23 DIAGNOSIS — E78.00 HYPERCHOLESTEROLEMIA: ICD-10-CM

## 2019-04-23 LAB
GLUCOSE POC: 173 MG/DL
HBA1C MFR BLD HPLC: 7.7 %

## 2019-04-23 RX ORDER — LISINOPRIL 2.5 MG/1
2.5 TABLET ORAL DAILY
Qty: 30 TAB | Refills: 5 | Status: SHIPPED | OUTPATIENT
Start: 2019-04-23

## 2019-04-23 RX ORDER — METOPROLOL TARTRATE 25 MG/1
25 TABLET, FILM COATED ORAL 2 TIMES DAILY
Qty: 180 TAB | Refills: 3 | Status: SHIPPED | OUTPATIENT
Start: 2019-04-23

## 2019-04-23 RX ORDER — RANITIDINE 150 MG/1
150 CAPSULE ORAL 2 TIMES DAILY
Qty: 60 CAP | Refills: 1 | Status: SHIPPED | OUTPATIENT
Start: 2019-04-23

## 2019-04-23 RX ORDER — SIMVASTATIN 40 MG/1
40 TABLET, FILM COATED ORAL
Qty: 30 TAB | Refills: 11 | Status: SHIPPED | OUTPATIENT
Start: 2019-04-23

## 2019-04-23 RX ORDER — METFORMIN HYDROCHLORIDE 1000 MG/1
1000 TABLET ORAL 2 TIMES DAILY WITH MEALS
Qty: 60 TAB | Refills: 3 | Status: SHIPPED | OUTPATIENT
Start: 2019-04-23

## 2019-04-23 NOTE — PATIENT INSTRUCTIONS
Lisinopril (Prinivil, Zestril) - (By mouth) Why this medicine is used:  
Treats high blood pressure and heart failure. Contact a nurse or doctor right away if you have: · Blistering, peeling, red skin rash · Change in how much or how often you urinate · Confusion, weakness, uneven heartbeat, trouble breathing · Lightheadedness, dizziness, fainting · Numbness or tingling in your hands, feet, or lips Common side effects: · Dry cough © 2017 [facility] Information is for End User's use only and may not be sold, redistributed or otherwise used for commercial purposes.

## 2019-04-23 NOTE — PROGRESS NOTES
Chief Complaint Patient presents with  Medication Refill HPI: 
Bertha Contreras is a 44 y.o.  male with h/o hypertension, hypercholesterolemia, diabetes type 2, h/o normal cardiac cath presents to establish care for medication refill. Patient has been doing well. Blood pressure is at goal.  Last A1C=11.5% 10/3/2018. Patient has no complaints. Review of Systems Constitutional: negative for fevers/chills Eyes:   negative for visual disturbance Respiratory:  negative for cough, dyspnea, wheezing CV:   negative for chest pain, palpitations, lower extremity edema GI:   negative for nausea, vomiting, diarrhea, abdominal pain Endo:               negative for polyuria,polydipsia,polyphagia,heat intolerance Genitourinary: negative for frequency, dysuria Integument:  negative for rash and pruritus Musculoskel: negative for myalgias, arthralgias, back pain, joint pain Neurological:  negative for headaches, dizziness Behavl/Psych: negative for feelings of anxiety, depression, mood changes Past Medical History:  
Diagnosis Date  Abnormal nuclear cardiac imaging test 2018  Diabetes mellitus type 2, controlled ([facility] [address] [facility] 2018  Hypercholesterolemia  Hypertension  S/P cardiac cath 2018 cardiac cath with no significant disease No past surgical history on file. Social History Socioeconomic History  Marital status: SINGLE Spouse name: Not on file  Number of children: Not on file  Years of education: Not on file  Highest education level: Not on file Tobacco Use  Smoking status: Former Smoker Types: Cigarettes Last attempt to quit: 2014 Years since quittin.6  Smokeless tobacco: Current User Types: Chew Substance and Sexual Activity  Alcohol use: Yes Comment: occasionally  Drug use: No  
 
Family History Problem Relation Age of Onset  Heart Disease Father 48 Current Outpatient Medications Medication Sig Dispense Refill  metFORMIN (GLUCOPHAGE) 1,000 mg tablet Take 1 Tab by mouth two (2) times daily (with meals). Indications: type 2 diabetes mellitus 60 Tab 3  
 metoprolol tartrate (LOPRESSOR) 25 mg tablet Take 1 Tab by mouth two (2) times a day. 180 Tab 3  
 simvastatin (ZOCOR) 40 mg tablet Take 1 Tab by mouth nightly. 30 Tab 11  
 raNITIdine hcl 150 mg capsule Take 150 mg by mouth two (2) times a day. 60 Cap 1  
 glucose blood VI test strips (BLOOD GLUCOSE TEST) strip Use as directed 100 Strip 0  Blood-Glucose Meter monitoring kit Use as directed (Patient taking differently: three (3) times daily. Use as directed   freestyle meter) 1 Kit 0  
 aspirin delayed-release 81 mg tablet Take 1 Tab by mouth daily. 30 Tab 11  
 nitroglycerin (NITROSTAT) 0.4 mg SL tablet 1 Tab by SubLINGual route every five (5) minutes as needed (call 911 if not relieved by 3). 25 Tab 1  
 naproxen (NAPROSYN) 500 mg tablet Take 1 Tab by mouth every twelve (12) hours as needed for Pain. 20 Tab 0 Allergies Allergen Reactions  Imdur [Isosorbide Mononitrate] Other (comments) Severe headache Objective: 
Visit Vitals /74 Pulse 83 Temp 98.8 °F (37.1 °C) (Oral) Resp 20 Ht 5' 10\" (1.778 m) Wt 276 lb (125.2 kg) SpO2 98% BMI 39.60 kg/m² Physical Exam:  
General appearance - alert, well appearing in no distress Mental status - alert, oriented to person, place, and time EYE-PERRL, EOMI 
ENT-ENT exam normal, no neck nodes or sinus tenderness Mouth - mucous membranes moist, pharynx normal without lesions Neck - supple, no significant adenopathy Chest - clear to auscultation, no wheezes, rales or rhonchi Heart - normal rate, regular rhythm, normal blood pressure Abdomen - soft, nontender, nondistended, no organomegaly Ext-peripheral pulses normal, no pedal edema Neuro -alert, oriented, normal speech, no focal findings Back-full range of motion, no tenderness, palpable spasm or pain on motion Results for orders placed or performed in visit on 10/03/18 AMB POC GLUCOSE BLOOD, BY GLUCOSE MONITORING DEVICE Result Value Ref Range Glucose  mg/dL AMB POC HEMOGLOBIN A1C Result Value Ref Range Hemoglobin A1c (POC) 11.5 % Assessment/Plan: 
Diagnoses and all orders for this visit: 
 
1. Hypertension, well controlled 
-     metoprolol tartrate (LOPRESSOR) 25 mg tablet; Take 1 Tab by mouth two (2) times a day. -     METABOLIC PANEL, COMPREHENSIVE 
-     lisinopril (PRINIVIL, ZESTRIL) 2.5 mg tablet; Take 1 Tab by mouth daily. 2. Diabetes mellitus type 2 with neurological manifestations ([facility] 
-     metFORMIN (GLUCOPHAGE) 1,000 mg tablet; Take 1 Tab by mouth two (2) times daily (with meals). Indications: type 2 diabetes mellitus -     METABOLIC PANEL, COMPREHENSIVE 
-     MICROALBUMIN, UR, RAND W/ MICROALB/CREAT RATIO 
-     AMB POC GLUCOSE BLOOD, BY GLUCOSE MONITORING DEVICE 
-     AMB POC HEMOGLOBIN A1C 
 
3. Vitamin D deficiency 
-     VITAMIN D, 25 HYDROXY 4. Hypercholesterolemia 
-     simvastatin (ZOCOR) 40 mg tablet; Take 1 Tab by mouth nightly. 5. Dyslipidemia (high LDL; low HDL) 
-     simvastatin (ZOCOR) 40 mg tablet; Take 1 Tab by mouth nightly. -     LIPID PANEL 6. Diabetic eye exam ([facility] 
-     [address] [facility] 7. Frequency of urination 
-     URINALYSIS W/ RFLX MICROSCOPIC 8. Gastroesophageal reflux disease without esophagitis 
-     raNITIdine hcl 150 mg capsule; Take 150 mg by mouth two (2) times a day. 9. Encounter for diabetic foot exam ([facility] 
-      DIABETES FOOT EXAM 
 
10. Encounter for immunization Patient Instructions Lisinopril (Prinivil, Zestril) - (By mouth) Why this medicine is used:  
Treats high blood pressure and heart failure. Contact a nurse or doctor right away if you have: · Blistering, peeling, red skin rash · Change in how much or how often you urinate · Confusion, weakness, uneven heartbeat, trouble breathing · Lightheadedness, dizziness, fainting · Numbness or tingling in your hands, feet, or lips Common side effects: · Dry cough © 2017 [facility] Information is for End User's use only and may not be sold, redistributed or otherwise used for commercial purposes. Follow-up and Dispositions · Return in about 4 months (around 8/23/2019), or if symptoms worsen or fail to improve, for routine follow up.

## 2022-03-18 PROBLEM — R07.9 CHEST PAIN: Status: ACTIVE | Noted: 2018-09-06

## 2022-03-19 PROBLEM — Z98.890 S/P CARDIAC CATH: Status: ACTIVE | Noted: 2018-09-28

## 2022-03-19 PROBLEM — E11.9 DIABETES MELLITUS TYPE 2, CONTROLLED (HCC): Status: ACTIVE | Noted: 2018-09-28

## 2022-03-19 PROBLEM — R93.1 ABNORMAL NUCLEAR CARDIAC IMAGING TEST: Status: ACTIVE | Noted: 2018-09-28

## 2022-03-19 PROBLEM — F17.201: Status: ACTIVE | Noted: 2018-09-06

## 2022-03-20 PROBLEM — I20.9 ANGINA, CLASS III (HCC): Status: ACTIVE | Noted: 2018-09-28

## 2023-02-28 NOTE — Clinical Note
[facility] SYSTEMS Departments For adult and child immunizations, family planning, TB screening, STD testing and women's health services. [facility] CENTER: [place] [phone] [place] [phone] [facility]   [phone] [facility] Ci ty   [phone] [address]   [phone] [place]: Faith Mares [phone] [place] [phone] [facility] [phone] [address] For primary care services, woman and child wellness, and some clinic s providing specialty care. [facility] -- [facility]. [address] [place] [address] [phone] [address] [place] [address] 200 [facility] [address] [place] [address] [phone] [address] [phone] [facility] [address] [place] [address]  [phone] [address] [phone] [address] [phone] [facility] [address] [place] [address], 809 [facility]: [facility] 700 [facility], ext [phone] [facility], [address] [phone] [place] [address] [phone] [address] 5850 [facility]  [phone] [employer]  [address], [phone] 3555 [facility] ([email or web address]) [phone] Sexual Health/Woman Wellness Clinics For STD/HIV testing and treatment, pregnancy testing and services, men's health, birth control services, LGBT services, and hepatitis/HPV vaccine services. [facility] for [place] [employer] [address] [facility] [phone] [address] [phone] [address] [employer] [phone] [address], 5th floor [phone] [facility] o f [place] [address] [facility] [phone] [facility] for Women [address] [facility] [address]. [facility] [phone] Specialty Service Clinics 112 [facility] [phone] 6655 [facility]   [phone] [place]   [phone] Women, Infant and Children's Services: [address] [phone] 6166 [facility] [address] [phone] [address] [facility] 66 [facility] Psychiatry     [phone] [address] [place] [address]   [phone] 1 [facility] [phone] [facility] [address] [place] [address] Physicians [phone] MD Sandro Moody MD Prescilla Davidson, MD [facility] [phone] Bryn Ortiz, FNP MD Camden Boss MD Townsend Dill, MD [address] [address] [phone] Armon Boas, MD Eli Leatherwood, MD [employer] [facility] [phone] MD Pablo Goldstein MD Arleene Fogo, MD Marcina Gardener, MD 
[facility] [phone] MD Denisse Byrd MD Linden Craven, NP [address] [facility] [address] [phone] MD Madisyn Fox MD Vonzell Buoy, M D Venita Sparks, MD Balinda Huguenin, MD Shayne Graven, MD Ardelia Floras,  [address] [phone] Mg Oglesby MD [address] [facility] [address] [phone] MD Nathan Carter, MD Dilma Benz MD Raj Makos, MD Lane Squires, MD Lujean Dinning, MD 
[facility] [phone] MD Lesly Deluna, FNP Federico Morris, NP 
MD Divina Davidson MD Layman Casey, MD Nieves Roland, MD [facility] [phone] Major Roberto, MD Dieter Calderon, MD Efrain Lewis, MD Callum Wagoner, MD Michelle Torres MD 
[address] [phone] Ramona Akhtar, MD Miley Calloway, MD Corey [phone] Dorian Ferrara,  MD Kade Singh, MD Holly Christie, MD 
[address] [facility] Physicians [phone] Hua Becerril, MD Marvel Mello, MD Juany Vo, MD Nanda Laboy, MD Jenna Yu, MD Kapil Dash, NP Obinna Montano, MD 9698 [facility], [facility] [phone] Ambika Addison, MD Osbaldo Alexander, MD Jhony Chavez, MD CAMILA FALCON [facility] [phone] MD Vince Garrett, FNP Nury Feldman, MYRA Feldman,  FNP Salma Christianson, MYRA Verduzco, MD Baltazar Cowart, NP Aidan Meyer, DO Miscellaneous: 
Sada Gerardo [phone] No

## 2023-12-28 NOTE — ED NOTES
Pt. Resting comfortably in bed, denies needs at this time. Bed locked and low, call bell in reach. No

## 2024-01-16 ENCOUNTER — OFFICE VISIT (OUTPATIENT)
Age: 49
End: 2024-01-16
Payer: COMMERCIAL

## 2024-01-16 VITALS
BODY MASS INDEX: 37.8 KG/M2 | DIASTOLIC BLOOD PRESSURE: 80 MMHG | HEART RATE: 78 BPM | OXYGEN SATURATION: 96 % | HEIGHT: 70 IN | WEIGHT: 264 LBS | TEMPERATURE: 98 F | SYSTOLIC BLOOD PRESSURE: 132 MMHG | RESPIRATION RATE: 18 BRPM

## 2024-01-16 DIAGNOSIS — Z12.11 COLON CANCER SCREENING: ICD-10-CM

## 2024-01-16 DIAGNOSIS — E78.2 MIXED HYPERLIPIDEMIA: ICD-10-CM

## 2024-01-16 DIAGNOSIS — Z76.89 ENCOUNTER TO ESTABLISH CARE: Primary | ICD-10-CM

## 2024-01-16 DIAGNOSIS — Z12.5 PROSTATE CANCER SCREENING: ICD-10-CM

## 2024-01-16 DIAGNOSIS — E11.9 CONTROLLED TYPE 2 DIABETES MELLITUS WITHOUT COMPLICATION, WITHOUT LONG-TERM CURRENT USE OF INSULIN (HCC): ICD-10-CM

## 2024-01-16 PROBLEM — R07.9 CHEST PAIN: Status: RESOLVED | Noted: 2018-09-06 | Resolved: 2024-01-16

## 2024-01-16 PROBLEM — I20.9 ANGINA, CLASS III (HCC): Status: RESOLVED | Noted: 2018-09-28 | Resolved: 2024-01-16

## 2024-01-16 PROBLEM — E66.01 SEVERE OBESITY WITH BODY MASS INDEX (BMI) OF 35.0 TO 39.9 WITH SERIOUS COMORBIDITY (HCC): Status: ACTIVE | Noted: 2018-09-06

## 2024-01-16 LAB
ALBUMIN SERPL-MCNC: 4 G/DL (ref 3.5–5)
ALBUMIN/GLOB SERPL: 1.2 (ref 1.1–2.2)
ALP SERPL-CCNC: 90 U/L (ref 45–117)
ALT SERPL-CCNC: 46 U/L (ref 12–78)
ANION GAP SERPL CALC-SCNC: 8 MMOL/L (ref 5–15)
APPEARANCE UR: CLEAR
AST SERPL-CCNC: 12 U/L (ref 15–37)
BACTERIA URNS QL MICRO: NEGATIVE /HPF
BILIRUB SERPL-MCNC: 0.8 MG/DL (ref 0.2–1)
BILIRUB UR QL: NEGATIVE
BUN SERPL-MCNC: 24 MG/DL (ref 6–20)
BUN/CREAT SERPL: 20 (ref 12–20)
CALCIUM SERPL-MCNC: 9.7 MG/DL (ref 8.5–10.1)
CHLORIDE SERPL-SCNC: 100 MMOL/L (ref 97–108)
CHOLEST SERPL-MCNC: 275 MG/DL
CO2 SERPL-SCNC: 27 MMOL/L (ref 21–32)
COLOR UR: ABNORMAL
CREAT SERPL-MCNC: 1.18 MG/DL (ref 0.7–1.3)
CREAT UR-MCNC: 20.8 MG/DL
EPITH CASTS URNS QL MICRO: ABNORMAL /LPF
ERYTHROCYTE [DISTWIDTH] IN BLOOD BY AUTOMATED COUNT: 13 % (ref 11.5–14.5)
GLOBULIN SER CALC-MCNC: 3.4 G/DL (ref 2–4)
GLUCOSE SERPL-MCNC: 513 MG/DL (ref 65–100)
GLUCOSE UR STRIP.AUTO-MCNC: >1000 MG/DL
HCT VFR BLD AUTO: 48.6 % (ref 36.6–50.3)
HDLC SERPL-MCNC: 38 MG/DL
HDLC SERPL: 7.2 (ref 0–5)
HGB BLD-MCNC: 16.4 G/DL (ref 12.1–17)
HGB UR QL STRIP: NEGATIVE
HYALINE CASTS URNS QL MICRO: ABNORMAL /LPF (ref 0–5)
KETONES UR QL STRIP.AUTO: ABNORMAL MG/DL
LDLC SERPL CALC-MCNC: ABNORMAL MG/DL (ref 0–100)
LDLC SERPL DIRECT ASSAY-MCNC: 192 MG/DL (ref 0–100)
LEUKOCYTE ESTERASE UR QL STRIP.AUTO: NEGATIVE
MCH RBC QN AUTO: 32.4 PG (ref 26–34)
MCHC RBC AUTO-ENTMCNC: 33.7 G/DL (ref 30–36.5)
MCV RBC AUTO: 96 FL (ref 80–99)
MICROALBUMIN UR-MCNC: <0.5 MG/DL
MICROALBUMIN/CREAT UR-RTO: <24 MG/G (ref 0–30)
NITRITE UR QL STRIP.AUTO: NEGATIVE
NRBC # BLD: 0 K/UL (ref 0–0.01)
NRBC BLD-RTO: 0 PER 100 WBC
PH UR STRIP: 6.5 (ref 5–8)
PLATELET # BLD AUTO: 310 K/UL (ref 150–400)
PMV BLD AUTO: 11.3 FL (ref 8.9–12.9)
POTASSIUM SERPL-SCNC: 5.1 MMOL/L (ref 3.5–5.1)
PROT SERPL-MCNC: 7.4 G/DL (ref 6.4–8.2)
PROT UR STRIP-MCNC: NEGATIVE MG/DL
PSA SERPL-MCNC: 0.3 NG/ML (ref 0.01–4)
RBC # BLD AUTO: 5.06 M/UL (ref 4.1–5.7)
RBC #/AREA URNS HPF: ABNORMAL /HPF (ref 0–5)
SODIUM SERPL-SCNC: 135 MMOL/L (ref 136–145)
SP GR UR REFRACTOMETRY: 1.01 (ref 1–1.03)
TRIGL SERPL-MCNC: 454 MG/DL
TSH SERPL DL<=0.05 MIU/L-ACNC: 1.41 UIU/ML (ref 0.36–3.74)
UROBILINOGEN UR QL STRIP.AUTO: 0.2 EU/DL (ref 0.2–1)
VLDLC SERPL CALC-MCNC: ABNORMAL MG/DL
WBC # BLD AUTO: 8.8 K/UL (ref 4.1–11.1)
WBC URNS QL MICRO: ABNORMAL /HPF (ref 0–4)

## 2024-01-16 PROCEDURE — 99204 OFFICE O/P NEW MOD 45 MIN: CPT | Performed by: NURSE PRACTITIONER

## 2024-01-16 RX ORDER — ASPIRIN 81 MG/1
81 TABLET ORAL DAILY
COMMUNITY
Start: 2018-09-06 | End: 2024-01-16

## 2024-01-16 RX ORDER — LISINOPRIL 2.5 MG/1
2.5 TABLET ORAL DAILY
COMMUNITY
Start: 2019-04-23 | End: 2024-01-16

## 2024-01-16 RX ORDER — SIMVASTATIN 40 MG
1 TABLET ORAL NIGHTLY
COMMUNITY
Start: 2019-04-23 | End: 2024-01-16

## 2024-01-16 RX ORDER — NITROGLYCERIN 0.4 MG/1
0.4 TABLET SUBLINGUAL EVERY 5 MIN PRN
COMMUNITY
Start: 2018-09-06 | End: 2024-01-16

## 2024-01-16 RX ORDER — NAPROXEN 500 MG/1
500 TABLET ORAL 2 TIMES DAILY WITH MEALS
COMMUNITY
Start: 2018-08-28 | End: 2024-01-16

## 2024-01-16 RX ORDER — RANITIDINE 150 MG/1
150 CAPSULE ORAL 2 TIMES DAILY
COMMUNITY
Start: 2019-04-23 | End: 2024-01-16

## 2024-01-16 SDOH — ECONOMIC STABILITY: HOUSING INSECURITY
IN THE LAST 12 MONTHS, WAS THERE A TIME WHEN YOU DID NOT HAVE A STEADY PLACE TO SLEEP OR SLEPT IN A SHELTER (INCLUDING NOW)?: NO

## 2024-01-16 SDOH — ECONOMIC STABILITY: FOOD INSECURITY: WITHIN THE PAST 12 MONTHS, THE FOOD YOU BOUGHT JUST DIDN'T LAST AND YOU DIDN'T HAVE MONEY TO GET MORE.: NEVER TRUE

## 2024-01-16 SDOH — ECONOMIC STABILITY: INCOME INSECURITY: HOW HARD IS IT FOR YOU TO PAY FOR THE VERY BASICS LIKE FOOD, HOUSING, MEDICAL CARE, AND HEATING?: NOT VERY HARD

## 2024-01-16 SDOH — ECONOMIC STABILITY: FOOD INSECURITY: WITHIN THE PAST 12 MONTHS, YOU WORRIED THAT YOUR FOOD WOULD RUN OUT BEFORE YOU GOT MONEY TO BUY MORE.: NEVER TRUE

## 2024-01-16 ASSESSMENT — ENCOUNTER SYMPTOMS
SHORTNESS OF BREATH: 0
BLOOD IN STOOL: 0
ABDOMINAL PAIN: 0
NAUSEA: 0
CONSTIPATION: 0
COUGH: 0
VOMITING: 0
DIARRHEA: 0

## 2024-01-16 ASSESSMENT — PATIENT HEALTH QUESTIONNAIRE - PHQ9
SUM OF ALL RESPONSES TO PHQ QUESTIONS 1-9: 0
SUM OF ALL RESPONSES TO PHQ QUESTIONS 1-9: 0
SUM OF ALL RESPONSES TO PHQ9 QUESTIONS 1 & 2: 0
SUM OF ALL RESPONSES TO PHQ QUESTIONS 1-9: 0
1. LITTLE INTEREST OR PLEASURE IN DOING THINGS: 0
SUM OF ALL RESPONSES TO PHQ QUESTIONS 1-9: 0
2. FEELING DOWN, DEPRESSED OR HOPELESS: 0

## 2024-01-16 NOTE — PROGRESS NOTES
Chief Complaint   Patient presents with    New Patient       1. \"Have you been to the ER, urgent care clinic since your last visit?  Hospitalized since your last visit?\" No    2. \"Have you seen or consulted any other health care providers outside of the [facility] since your last visit?\" No     3. For patients aged 45-75: Has the patient had a colonoscopy / FIT/ Cologuard? No       The patient, Chetan Rios, identity was verified by name and .   Health Maintenance Due   Topic Date Due    Hepatitis B vaccine (1 of 3 - 3-dose series) Never done    COVID-19 Vaccine (1) Never done    Pneumococcal 0-64 years Vaccine (1 - PCV) Never done    A1C test (Diabetic or Prediabetic)  Never done    Lipids  Never done    Depression Screen  Never done    HIV screen  Never done    Diabetic Alb to Cr ratio (uACR) test  Never done    Diabetic retinal exam  Never done    GFR test (Diabetes, CKD 3-4, OR last GFR 15-59)  Never done    Hepatitis C screen  Never done    Diabetic foot exam  2020    Colorectal Cancer Screen  Never done    Flu vaccine (1) Never done

## 2024-01-16 NOTE — PROGRESS NOTES
Chetan Uriarte (:  1975) is a 48 y.o. male,New patient, here for evaluation of the following chief complaint(s):  New Patient         ASSESSMENT/PLAN:  1. Encounter to establish care  2. Controlled type 2 diabetes mellitus without complication, without long-term current use of insulin (HCC) - has not been on medication.  Discussed with patient reducing milk intake.  Will check labs and see where A1c is.  He used to take metformin and glipizide  -     CBC; Future  -     Comprehensive Metabolic Panel; Future  -     Hemoglobin A1C; Future  -     Lipid Panel; Future  -     Microalbumin / Creatinine Urine Ratio; Future  -     Urinalysis with Microscopic; Future  -     TSH; Future  3. Mixed hyperlipidemia - used to be on rosuvastatin, has not taken recently.  Will check FLP  4. Colon cancer screening  -     [facility] - Eusebio Golden MD, Gastroenterology, [place]  5. Prostate cancer screening  -     PSA Screening; Future      Return in about 4 months (around 2024).         Subjective   SUBJECTIVE/OBJECTIVE:  HPI  Patient comes in today to establish care  Previous PCP - Dr. Almaguer.  Last visit about 18 months ago or longer  Specialists - none    Hx diabetes  Has not been on medications for about 1 year.  Does not check BG regularly.  Was running 120-150 off medication.  On medication was in 90s.  Has been taking cinnamon if BG gets up to 300-400.    When BG gets high  Last A1c 7.7% in 2019  11.5% in Oct 2018  Used to take metformin 1000mg BID  Was on lisinopril 2.5mg for kidney protection  Drinks 1% milk.  Had cut back from whole milk.  Drinks about 1 gallon per day.  Does not eat a lot of fried foods.  Stays active.    Does not take medication regularly like he should.  Was taking metformin and glipizide    Hx cholesterol  Used to take simvastatin 40mg and rosuvastatin    S/P cardiac cath 2018 cardiac cath with no significant disease   Echo in 2018 -   SUMMARY:  Left ventricle:

## 2024-01-17 LAB
EST. AVERAGE GLUCOSE BLD GHB EST-MCNC: 312 MG/DL
HBA1C MFR BLD: 12.5 % (ref 4–5.6)

## 2024-01-19 ENCOUNTER — PATIENT MESSAGE (OUTPATIENT)
Age: 49
End: 2024-01-19

## 2024-01-19 DIAGNOSIS — E78.2 MIXED HYPERLIPIDEMIA: Primary | ICD-10-CM

## 2024-01-19 DIAGNOSIS — E11.65 TYPE 2 DIABETES MELLITUS WITH HYPERGLYCEMIA, WITHOUT LONG-TERM CURRENT USE OF INSULIN (HCC): ICD-10-CM

## 2024-01-19 RX ORDER — METFORMIN HYDROCHLORIDE 500 MG/1
1000 TABLET, EXTENDED RELEASE ORAL 2 TIMES DAILY WITH MEALS
Qty: 360 TABLET | Refills: 1 | Status: SHIPPED | OUTPATIENT
Start: 2024-01-19

## 2024-01-19 RX ORDER — ROSUVASTATIN CALCIUM 40 MG/1
40 TABLET, COATED ORAL DAILY
Qty: 90 TABLET | Refills: 1 | Status: SHIPPED | OUTPATIENT
Start: 2024-01-19

## 2024-01-19 RX ORDER — GLIPIZIDE 10 MG/1
10 TABLET, FILM COATED, EXTENDED RELEASE ORAL DAILY
Qty: 90 TABLET | Refills: 1 | Status: SHIPPED | OUTPATIENT
Start: 2024-01-19

## 2024-01-22 ENCOUNTER — TELEPHONE (OUTPATIENT)
Age: 49
End: 2024-01-22

## 2024-01-22 NOTE — TELEPHONE ENCOUNTER
----- Message from Kori aGrner LPN sent at 1/22/2024  8:43 AM EST -----  Patient contacted and made aware of lab results. No concerns voiced has already started on Metformin and Glipizide. Patient agreed to take Crestor daily.  ----- Message -----  From: Rosa Estrada APRN - NP  Sent: 1/17/2024   8:03 PM EST  To: Kori Garner LPN    A1c 12.5%!!!!  Diabetes UNCONTROLLED.  Your glucose level was 513!!  Need to restart Metformin and Glipizide.  Needs your doses for both of those medications so I can put in your chart and send refills.  Are you interested in seeing someone at the diabetes treatment center for diabetes education?    LDL, or bad cholesterol is high!  Would like for you to take Crestor (rosuvastatin) daily.  Do you have any at home and if so, what dose were you taking?    Liver and kidney function normal  Prostate cancer screening negative  Blood counts normal.  Thyroid screening negative.

## 2024-03-18 ENCOUNTER — TELEPHONE (OUTPATIENT)
Age: 49
End: 2024-03-18

## 2024-03-18 NOTE — TELEPHONE ENCOUNTER
----- Message from Ly Alejandro sent at 3/15/2024  9:35 AM EDT -----  Subject: Message to Provider    QUESTIONS  Information for Provider? Patient's wife, Eve Uriarte called back to   provide the provider that is in the Patient's network so that he can   switch providers. The provider in the Patient's network is Dr. Eusebio Fonseca. She spoke with someone 3/14/2024 it was as if his PCP would not   be back for months. Please call back Eve to discuss  ---------------------------------------------------------------------------  --------------  CALL BACK INFO  [phone]; OK to leave message on voicemail  ---------------------------------------------------------------------------  --------------  SCRIPT ANSWERS  undefined

## 2024-05-03 ENCOUNTER — OFFICE VISIT (OUTPATIENT)
Age: 49
End: 2024-05-03
Payer: COMMERCIAL

## 2024-05-03 VITALS
SYSTOLIC BLOOD PRESSURE: 140 MMHG | DIASTOLIC BLOOD PRESSURE: 90 MMHG | TEMPERATURE: 98.6 F | HEART RATE: 68 BPM | BODY MASS INDEX: 37.08 KG/M2 | WEIGHT: 258.4 LBS | OXYGEN SATURATION: 95 % | RESPIRATION RATE: 20 BRPM

## 2024-05-03 DIAGNOSIS — E78.2 MIXED HYPERLIPIDEMIA: ICD-10-CM

## 2024-05-03 DIAGNOSIS — E11.65 TYPE 2 DIABETES MELLITUS WITH HYPERGLYCEMIA, WITHOUT LONG-TERM CURRENT USE OF INSULIN (HCC): Primary | ICD-10-CM

## 2024-05-03 LAB
ALBUMIN SERPL-MCNC: 4.1 G/DL (ref 3.5–5)
ALBUMIN/GLOB SERPL: 1.2 (ref 1.1–2.2)
ALP SERPL-CCNC: 91 U/L (ref 45–117)
ALT SERPL-CCNC: 37 U/L (ref 12–78)
ANION GAP SERPL CALC-SCNC: 5 MMOL/L (ref 5–15)
AST SERPL-CCNC: 16 U/L (ref 15–37)
BILIRUB SERPL-MCNC: 1.1 MG/DL (ref 0.2–1)
BUN SERPL-MCNC: 20 MG/DL (ref 6–20)
BUN/CREAT SERPL: 19 (ref 12–20)
CALCIUM SERPL-MCNC: 9.9 MG/DL (ref 8.5–10.1)
CHLORIDE SERPL-SCNC: 103 MMOL/L (ref 97–108)
CHOLEST SERPL-MCNC: 137 MG/DL
CO2 SERPL-SCNC: 27 MMOL/L (ref 21–32)
CREAT SERPL-MCNC: 1.07 MG/DL (ref 0.7–1.3)
EST. AVERAGE GLUCOSE BLD GHB EST-MCNC: 183 MG/DL
GLOBULIN SER CALC-MCNC: 3.4 G/DL (ref 2–4)
GLUCOSE SERPL-MCNC: 235 MG/DL (ref 65–100)
HBA1C MFR BLD: 8 % (ref 4–5.6)
HDLC SERPL-MCNC: 41 MG/DL
HDLC SERPL: 3.3 (ref 0–5)
LDLC SERPL CALC-MCNC: 73.2 MG/DL (ref 0–100)
POTASSIUM SERPL-SCNC: 4.8 MMOL/L (ref 3.5–5.1)
PROT SERPL-MCNC: 7.5 G/DL (ref 6.4–8.2)
SODIUM SERPL-SCNC: 135 MMOL/L (ref 136–145)
TRIGL SERPL-MCNC: 114 MG/DL
VLDLC SERPL CALC-MCNC: 22.8 MG/DL

## 2024-05-03 PROCEDURE — 99213 OFFICE O/P EST LOW 20 MIN: CPT | Performed by: FAMILY MEDICINE

## 2024-05-03 PROCEDURE — 3046F HEMOGLOBIN A1C LEVEL >9.0%: CPT | Performed by: FAMILY MEDICINE

## 2024-05-03 RX ORDER — GLIPIZIDE 10 MG/1
10 TABLET, FILM COATED, EXTENDED RELEASE ORAL DAILY
Qty: 90 TABLET | Refills: 3 | Status: SHIPPED | OUTPATIENT
Start: 2024-05-03

## 2024-05-03 RX ORDER — METFORMIN HYDROCHLORIDE 500 MG/1
1000 TABLET, EXTENDED RELEASE ORAL 2 TIMES DAILY WITH MEALS
Qty: 360 TABLET | Refills: 3 | Status: SHIPPED | OUTPATIENT
Start: 2024-05-03

## 2024-05-03 RX ORDER — ROSUVASTATIN CALCIUM 40 MG/1
40 TABLET, COATED ORAL DAILY
Qty: 90 TABLET | Refills: 3 | Status: SHIPPED | OUTPATIENT
Start: 2024-05-03

## 2024-05-03 ASSESSMENT — PATIENT HEALTH QUESTIONNAIRE - PHQ9
SUM OF ALL RESPONSES TO PHQ QUESTIONS 1-9: 0
SUM OF ALL RESPONSES TO PHQ QUESTIONS 1-9: 0
2. FEELING DOWN, DEPRESSED OR HOPELESS: NOT AT ALL
1. LITTLE INTEREST OR PLEASURE IN DOING THINGS: NOT AT ALL
SUM OF ALL RESPONSES TO PHQ QUESTIONS 1-9: 0
SUM OF ALL RESPONSES TO PHQ QUESTIONS 1-9: 0
SUM OF ALL RESPONSES TO PHQ9 QUESTIONS 1 & 2: 0

## 2024-05-03 NOTE — PROGRESS NOTES
Chief Complaint   Patient presents with    Diabetes       1. \"Have you been to the ER, urgent care clinic since your last visit?  Hospitalized since your last visit?\" No    2. \"Have you seen or consulted any other health care providers outside of the [facility] since your last visit?\" No     3. For patients aged 45-75: Has the patient had a colonoscopy / FIT/ Cologuard? No      The patient, Chetan Maldonados, identity was verified by name and .    Health Maintenance Due   Topic Date Due    Diabetic retinal exam  Never done    Diabetic foot exam  2020    Colorectal Cancer Screen  Never done    A1C test (Diabetic or Prediabetic)  2024

## 2024-05-03 NOTE — PROGRESS NOTES
Chetan Uriarte (:  1975) is a 48 y.o. male,Established patient, here for evaluation of the following chief complaint(s):  Diabetes      Assessment & Plan   1. Type 2 diabetes mellitus with hyperglycemia, without long-term current use of insulin (HCC)  -     Hemoglobin A1C; Future  -     Comprehensive Metabolic Panel; Future  -     glipiZIDE (GLUCOTROL XL) 10 MG extended release tablet; Take 1 tablet by mouth daily For diabetes, Disp-90 tablet, R-3Normal  -     metFORMIN (GLUCOPHAGE-XR) 500 MG extended release tablet; Take 2 tablets by mouth with breakfast and with evening meal For diabetes, Disp-360 tablet, R-3Normal  2. Mixed hyperlipidemia  -     Lipid Panel; Future  -     rosuvastatin (CRESTOR) 40 MG tablet; Take 1 tablet by mouth daily For cholesterol, Disp-90 tablet, R-3Normal      Return in about 6 months (around 11/3/2024).       Subjective   HPI had been on meds for cholesterol and diabetes for 4 years. Then had been off medicines for a year, saw Rosa in January, and is now back on metformin, glipizide and rosuvastatin. No complaints of chest pain, shortness of breath, TIAs, claudication or edema.  No polyuria, polydipsia. Grandmother had diabetes \, mother and father dont. No hypoglycemic episodes.     Review of Systems       Objective   Physical Exam  Cardiovascular:      Rate and Rhythm: Normal rate and regular rhythm.      Heart sounds: Normal heart sounds. No murmur heard.  Pulmonary:      Effort: Pulmonary effort is normal.      Breath sounds: Normal breath sounds.   Abdominal:      General: Abdomen is flat. Bowel sounds are normal.      Palpations: Abdomen is soft.   Musculoskeletal:      Right lower leg: No edema.      Left lower leg: No edema.                An electronic signature was used to authenticate this note.    --Eusebio Fonseca MD

## 2024-05-07 ENCOUNTER — TELEPHONE (OUTPATIENT)
Age: 49
End: 2024-05-07

## 2024-05-07 DIAGNOSIS — E11.65 TYPE 2 DIABETES MELLITUS WITH HYPERGLYCEMIA, WITHOUT LONG-TERM CURRENT USE OF INSULIN (HCC): ICD-10-CM

## 2024-05-07 RX ORDER — GLIPIZIDE 10 MG/1
TABLET, FILM COATED, EXTENDED RELEASE ORAL
Qty: 180 TABLET | Refills: 3 | Status: SHIPPED | OUTPATIENT
Start: 2024-05-07

## 2024-05-07 NOTE — TELEPHONE ENCOUNTER
Pc with pt. Will increase glipizide to 10 mg bid. Otherwise doing very well. Recheck in 6 months.

## 2024-10-28 ENCOUNTER — OFFICE VISIT (OUTPATIENT)
Age: 49
End: 2024-10-28
Payer: COMMERCIAL

## 2024-10-28 VITALS
BODY MASS INDEX: 38.14 KG/M2 | TEMPERATURE: 97.4 F | DIASTOLIC BLOOD PRESSURE: 64 MMHG | WEIGHT: 266.4 LBS | HEIGHT: 70 IN | HEART RATE: 75 BPM | RESPIRATION RATE: 16 BRPM | SYSTOLIC BLOOD PRESSURE: 114 MMHG | OXYGEN SATURATION: 95 %

## 2024-10-28 DIAGNOSIS — E11.65 TYPE 2 DIABETES MELLITUS WITH HYPERGLYCEMIA, WITHOUT LONG-TERM CURRENT USE OF INSULIN (HCC): ICD-10-CM

## 2024-10-28 DIAGNOSIS — R21 PAPULAR RASH: ICD-10-CM

## 2024-10-28 DIAGNOSIS — R21 PAPULAR RASH: Primary | ICD-10-CM

## 2024-10-28 DIAGNOSIS — N52.9 ERECTILE DYSFUNCTION, UNSPECIFIED ERECTILE DYSFUNCTION TYPE: ICD-10-CM

## 2024-10-28 DIAGNOSIS — E78.2 MIXED HYPERLIPIDEMIA: ICD-10-CM

## 2024-10-28 PROCEDURE — 3052F HG A1C>EQUAL 8.0%<EQUAL 9.0%: CPT | Performed by: FAMILY MEDICINE

## 2024-10-28 PROCEDURE — 99213 OFFICE O/P EST LOW 20 MIN: CPT | Performed by: FAMILY MEDICINE

## 2024-10-28 RX ORDER — DOXYCYCLINE 100 MG/1
100 CAPSULE ORAL 2 TIMES DAILY
COMMUNITY
Start: 2024-10-20

## 2024-10-28 RX ORDER — SILDENAFIL 100 MG/1
100 TABLET, FILM COATED ORAL DAILY PRN
Qty: 10 TABLET | Refills: 5 | Status: SHIPPED | OUTPATIENT
Start: 2024-10-28

## 2024-10-28 NOTE — PROGRESS NOTES
Chief Complaint   Patient presents with    Follow-Up from Hospital     Seen in ER for hands & feet swollen, rash and red dots.       \"Have you been to the ER, urgent care clinic since your last visit?  Hospitalized since your last visit?\"    YES - When: approximately 1  weeks ago.  Where and Why: ER [facility].    “Have you seen or consulted any other health care providers outside of [facility] since your last visit?”    NO        “Have you had a colorectal cancer screening such as a colonoscopy/FIT/Cologuard?    NO    No colonoscopy on file  No cologuard on file  No FIT/FOBT on file   No flexible sigmoidoscopy on file         Click Here for Release of Records Request         Health Maintenance Due   Topic Date Due    Diabetic retinal exam  Never done    Diabetic foot exam  2020    Colorectal Cancer Screen  Never done    Flu vaccine (1) Never done    COVID-19 Vaccine ( season) Never done        The patient, Chetan MATA Chapito, identity was verified by name and .

## 2024-10-28 NOTE — PROGRESS NOTES
Chetan Uriarte (:  1975) is a 48 y.o. male,Established patient, here for evaluation of the following chief complaint(s):  Follow-Up from Hospital (Seen in ER for hands & feet swollen, rash and red dots.)         Assessment & Plan  Papular rash       Orders:    RPR; Future    T. pallidum Ab; Future    Mixed hyperlipidemia       Orders:    Lipid Panel; Future    Type 2 diabetes mellitus with hyperglycemia, without long-term current use of insulin (HCC)       Orders:    Hemoglobin A1C; Future    Comprehensive Metabolic Panel; Future    Microalbumin / Creatinine Urine Ratio; Future    Erectile dysfunction, unspecified erectile dysfunction type       Orders:    sildenafil (VIAGRA) 100 MG tablet; Take 1 tablet by mouth daily as needed for Erectile Dysfunction 1/2 - 1 tab po one hour before sex on an empty stomach  recheck 6 months.     No follow-ups on file.       Subjective   HPI was having swelling in hands and feet a week ago, and it was painful to walk finger tips were numb. Went to ER on  (one week ago) ([facility] They werent sure what was going on, had multiple blood tests, was checked for multiple infectious diseases. Was put on doxycycline for 10 days. Feels like is back to normal. WBC and HGB were apparently normal. Had a rash on the top of hands and wrist. Had red spots over palms and fingers, which has cleared upl. Had  a fever, but no headache. Wasn't really feeling bad. Also had some itching from rash. Benadryl made him very sleepy.  Works outside, does tree work. NO recent tick bites. , no recent unprotected sex. Also wonders if can have his A1c checked today instead of having to come back next month.     Review of Systems       Objective   Physical Exam  Cardiovascular:      Rate and Rhythm: Normal rate and regular rhythm.      Heart sounds: Normal heart sounds. No murmur heard.  Pulmonary:      Effort: Pulmonary effort is normal.      Breath sounds: Normal breath sounds.

## 2024-10-29 LAB
ALBUMIN SERPL-MCNC: 4 G/DL (ref 3.5–5)
ALBUMIN/GLOB SERPL: 1.1 (ref 1.1–2.2)
ALP SERPL-CCNC: 81 U/L (ref 45–117)
ALT SERPL-CCNC: 44 U/L (ref 12–78)
ANION GAP SERPL CALC-SCNC: 4 MMOL/L (ref 2–12)
AST SERPL-CCNC: 15 U/L (ref 15–37)
BILIRUB SERPL-MCNC: 0.7 MG/DL (ref 0.2–1)
BUN SERPL-MCNC: 22 MG/DL (ref 6–20)
BUN/CREAT SERPL: 21 (ref 12–20)
CALCIUM SERPL-MCNC: 9.8 MG/DL (ref 8.5–10.1)
CHLORIDE SERPL-SCNC: 105 MMOL/L (ref 97–108)
CHOLEST SERPL-MCNC: 144 MG/DL
CO2 SERPL-SCNC: 31 MMOL/L (ref 21–32)
CREAT SERPL-MCNC: 1.05 MG/DL (ref 0.7–1.3)
CREAT UR-MCNC: 89.2 MG/DL
EST. AVERAGE GLUCOSE BLD GHB EST-MCNC: 206 MG/DL
GLOBULIN SER CALC-MCNC: 3.6 G/DL (ref 2–4)
GLUCOSE SERPL-MCNC: 225 MG/DL (ref 65–100)
HBA1C MFR BLD: 8.8 % (ref 4–5.6)
HDLC SERPL-MCNC: 34 MG/DL
HDLC SERPL: 4.2 (ref 0–5)
LDLC SERPL CALC-MCNC: 43.2 MG/DL (ref 0–100)
MICROALBUMIN UR-MCNC: 0.82 MG/DL
MICROALBUMIN/CREAT UR-RTO: 9 MG/G (ref 0–30)
POTASSIUM SERPL-SCNC: 4.4 MMOL/L (ref 3.5–5.1)
PROT SERPL-MCNC: 7.6 G/DL (ref 6.4–8.2)
RPR SER QL: NONREACTIVE
SODIUM SERPL-SCNC: 140 MMOL/L (ref 136–145)
SPECIMEN HOLD: NORMAL
TRIGL SERPL-MCNC: 334 MG/DL
VLDLC SERPL CALC-MCNC: 66.8 MG/DL

## 2024-10-30 LAB — T PALLIDUM AB SER QL IA: NON REACTIVE

## 2024-11-06 ENCOUNTER — TELEPHONE (OUTPATIENT)
Age: 49
End: 2024-11-06

## 2024-11-06 NOTE — TELEPHONE ENCOUNTER
Pc with pt. Recommended adding another diabetes meds, pt would rather try diet for now. To recheck in 3 months.

## 2025-03-30 SDOH — ECONOMIC STABILITY: INCOME INSECURITY: IN THE LAST 12 MONTHS, WAS THERE A TIME WHEN YOU WERE NOT ABLE TO PAY THE MORTGAGE OR RENT ON TIME?: NO

## 2025-03-30 SDOH — ECONOMIC STABILITY: TRANSPORTATION INSECURITY
IN THE PAST 12 MONTHS, HAS LACK OF TRANSPORTATION KEPT YOU FROM MEETINGS, WORK, OR FROM GETTING THINGS NEEDED FOR DAILY LIVING?: NO

## 2025-03-30 SDOH — ECONOMIC STABILITY: FOOD INSECURITY: WITHIN THE PAST 12 MONTHS, YOU WORRIED THAT YOUR FOOD WOULD RUN OUT BEFORE YOU GOT MONEY TO BUY MORE.: NEVER TRUE

## 2025-03-30 SDOH — ECONOMIC STABILITY: FOOD INSECURITY: WITHIN THE PAST 12 MONTHS, THE FOOD YOU BOUGHT JUST DIDN'T LAST AND YOU DIDN'T HAVE MONEY TO GET MORE.: NEVER TRUE

## 2025-03-30 SDOH — ECONOMIC STABILITY: TRANSPORTATION INSECURITY
IN THE PAST 12 MONTHS, HAS THE LACK OF TRANSPORTATION KEPT YOU FROM MEDICAL APPOINTMENTS OR FROM GETTING MEDICATIONS?: NO

## 2025-04-02 ENCOUNTER — OFFICE VISIT (OUTPATIENT)
Age: 50
End: 2025-04-02
Payer: COMMERCIAL

## 2025-04-02 VITALS
OXYGEN SATURATION: 95 % | HEIGHT: 70 IN | WEIGHT: 266.6 LBS | RESPIRATION RATE: 16 BRPM | HEART RATE: 80 BPM | TEMPERATURE: 98.3 F | BODY MASS INDEX: 38.17 KG/M2

## 2025-04-02 DIAGNOSIS — Z12.5 PROSTATE CANCER SCREENING: ICD-10-CM

## 2025-04-02 DIAGNOSIS — E11.65 TYPE 2 DIABETES MELLITUS WITH HYPERGLYCEMIA, WITHOUT LONG-TERM CURRENT USE OF INSULIN (HCC): Primary | ICD-10-CM

## 2025-04-02 DIAGNOSIS — J30.89 SEASONAL ALLERGIC RHINITIS DUE TO OTHER ALLERGIC TRIGGER: ICD-10-CM

## 2025-04-02 LAB
GLUCOSE, POC: 177 MG/DL
HBA1C MFR BLD: 9.9 %

## 2025-04-02 PROCEDURE — 82962 GLUCOSE BLOOD TEST: CPT | Performed by: FAMILY MEDICINE

## 2025-04-02 PROCEDURE — 3046F HEMOGLOBIN A1C LEVEL >9.0%: CPT | Performed by: FAMILY MEDICINE

## 2025-04-02 PROCEDURE — 99214 OFFICE O/P EST MOD 30 MIN: CPT | Performed by: FAMILY MEDICINE

## 2025-04-02 PROCEDURE — 83036 HEMOGLOBIN GLYCOSYLATED A1C: CPT | Performed by: FAMILY MEDICINE

## 2025-04-02 RX ORDER — FEXOFENADINE HCL 180 MG/1
180 TABLET ORAL DAILY
Qty: 15 TABLET | Refills: 1
Start: 2025-04-02

## 2025-04-02 RX ORDER — ETODOLAC 400 MG/1
400 TABLET, FILM COATED ORAL 2 TIMES DAILY PRN
COMMUNITY
Start: 2025-02-17

## 2025-04-02 RX ORDER — FLUTICASONE PROPIONATE 50 MCG
2 SPRAY, SUSPENSION (ML) NASAL DAILY
Qty: 17 G | Refills: 1
Start: 2025-04-02

## 2025-04-02 ASSESSMENT — PATIENT HEALTH QUESTIONNAIRE - PHQ9
SUM OF ALL RESPONSES TO PHQ QUESTIONS 1-9: 0
SUM OF ALL RESPONSES TO PHQ QUESTIONS 1-9: 0
2. FEELING DOWN, DEPRESSED OR HOPELESS: NOT AT ALL
SUM OF ALL RESPONSES TO PHQ QUESTIONS 1-9: 0
1. LITTLE INTEREST OR PLEASURE IN DOING THINGS: NOT AT ALL
SUM OF ALL RESPONSES TO PHQ QUESTIONS 1-9: 0

## 2025-04-02 NOTE — PROGRESS NOTES
Chief Complaint   Patient presents with    Diabetes    Cholesterol Problem    Follow-up       \"Have you been to the ER, urgent care clinic since your last visit?  Hospitalized since your last visit?\"    NO    “Have you seen or consulted any other health care providers outside of [facility] since your last visit?”      12/3/24 - [facility] - DR. TAMMY KING      “Have you had a colorectal cancer screening such as a colonoscopy/FIT/Cologuard?    NO    No colonoscopy on file  No cologuard on file  No FIT/FOBT on file   No flexible sigmoidoscopy on file         Click Here for Release of Records Request       Vitals:    25 1425   Pulse: 80   Resp: 16   Temp: 98.3 °F (36.8 °C)   SpO2: 95%      Health Maintenance Due   Topic Date Due    HIV screen  Never done    Diabetic retinal exam  Never done    Hepatitis C screen  Never done    Hepatitis B vaccine (1 of 3 - 19+ 3-dose series) Never done    Pneumococcal 0-49 years Vaccine (1 of 2 - PCV) Never done    Diabetic foot exam  2020    Colorectal Cancer Screen  Never done    COVID-19 Vaccine ( season) Never done        The patient, Chetan Uriarte, identity was verified by name and .

## 2025-04-02 NOTE — PROGRESS NOTES
Chetan Uriarte (:  1975) is a 49 y.o. male,Established patient, here for evaluation of the following chief complaint(s):  Diabetes, Cholesterol Problem, and Follow-up         Assessment & Plan  Type 2 diabetes mellitus with hyperglycemia, without long-term current use of insulin (HCC)       Orders:    AMB POC GLUCOSE BLOOD, BY GLUCOSE MONITORING DEVICE    AMB POC HEMOGLOBIN A1C    [facility] DIABETES FOOT EXAM    dulaglutide (TRULICITY) 1.5 MG/0.5ML SC injection; Inject 0.5 mLs into the skin every 7 days    Lipid Panel; Future    Comprehensive Metabolic Panel; Future    CBC with Auto Differential; Future    Prostate cancer screening       Orders:    PSA Screening; Future    Seasonal allergic rhinitis due to other allergic trigger       Orders:    fexofenadine (ALLEGRA) 180 MG tablet; Take 1 tablet by mouth daily For allergies    fluticasone (FLONASE) 50 MCG/ACT nasal spray; 2 sprays by Each Nostril route daily      No follow-ups on file.       Subjective   HPI IN for diabetes, blood pressure and cholesterol check. NO polyuria , polydipsia. No hypoglycemic symptoms. No complaints of chest pain, shortness of breath, TIAs, claudication or edema.also having problems with runny nose, head congestion. Taking zyrtec- some relief.       Review of Systems       Objective   Physical Exam  Cardiovascular:      Rate and Rhythm: Normal rate and regular rhythm.      Heart sounds: Normal heart sounds. No murmur heard.  Pulmonary:      Effort: Pulmonary effort is normal.      Breath sounds: Normal breath sounds.   Abdominal:      General: Abdomen is flat. Bowel sounds are normal.      Palpations: Abdomen is soft.   Musculoskeletal:      Right lower leg: No edema.      Left lower leg: No edema.      Comments: 2+ pulses, no lesions.                   An electronic signature was used to authenticate this note.    --Eusebio Fonseca MD

## 2025-04-03 LAB
ALBUMIN SERPL-MCNC: 4.3 G/DL (ref 3.5–5)
ALBUMIN/GLOB SERPL: 1.3 (ref 1.1–2.2)
ALP SERPL-CCNC: 80 U/L (ref 45–117)
ALT SERPL-CCNC: 34 U/L (ref 12–78)
ANION GAP SERPL CALC-SCNC: 6 MMOL/L (ref 2–12)
AST SERPL-CCNC: 15 U/L (ref 15–37)
BASOPHILS # BLD: 0 K/UL (ref 0–0.1)
BASOPHILS NFR BLD: 0 % (ref 0–1)
BILIRUB SERPL-MCNC: 1 MG/DL (ref 0.2–1)
BUN SERPL-MCNC: 18 MG/DL (ref 6–20)
BUN/CREAT SERPL: 20 (ref 12–20)
CALCIUM SERPL-MCNC: 9.5 MG/DL (ref 8.5–10.1)
CHLORIDE SERPL-SCNC: 107 MMOL/L (ref 97–108)
CHOLEST SERPL-MCNC: 130 MG/DL
CO2 SERPL-SCNC: 27 MMOL/L (ref 21–32)
CREAT SERPL-MCNC: 0.91 MG/DL (ref 0.7–1.3)
DIFFERENTIAL METHOD BLD: ABNORMAL
EOSINOPHIL # BLD: 0.21 K/UL (ref 0–0.4)
EOSINOPHIL NFR BLD: 2 % (ref 0–7)
ERYTHROCYTE [DISTWIDTH] IN BLOOD BY AUTOMATED COUNT: 13.5 % (ref 11.5–14.5)
GLOBULIN SER CALC-MCNC: 3.3 G/DL (ref 2–4)
GLUCOSE SERPL-MCNC: 140 MG/DL (ref 65–100)
HCT VFR BLD AUTO: 46.7 % (ref 36.6–50.3)
HDLC SERPL-MCNC: 41 MG/DL
HDLC SERPL: 3.2 (ref 0–5)
HGB BLD-MCNC: 15.7 G/DL (ref 12.1–17)
IMM GRANULOCYTES # BLD AUTO: 0 K/UL
IMM GRANULOCYTES NFR BLD AUTO: 0 %
LDLC SERPL CALC-MCNC: 68.4 MG/DL (ref 0–100)
LYMPHOCYTES # BLD: 1.58 K/UL (ref 0.8–3.5)
LYMPHOCYTES NFR BLD: 15 % (ref 12–49)
MCH RBC QN AUTO: 31.5 PG (ref 26–34)
MCHC RBC AUTO-ENTMCNC: 33.6 G/DL (ref 30–36.5)
MCV RBC AUTO: 93.6 FL (ref 80–99)
METAMYELOCYTES NFR BLD MANUAL: 1 %
MONOCYTES # BLD: 0.63 K/UL (ref 0–1)
MONOCYTES NFR BLD: 6 % (ref 5–13)
NEUTS SEG # BLD: 7.98 K/UL (ref 1.8–8)
NEUTS SEG NFR BLD: 76 % (ref 32–75)
NRBC # BLD: 0 K/UL (ref 0–0.01)
NRBC BLD-RTO: 0 PER 100 WBC
PLATELET # BLD AUTO: 325 K/UL (ref 150–400)
PLATELET COMMENT: ABNORMAL
PMV BLD AUTO: 11 FL (ref 8.9–12.9)
POTASSIUM SERPL-SCNC: 4.3 MMOL/L (ref 3.5–5.1)
PROT SERPL-MCNC: 7.6 G/DL (ref 6.4–8.2)
PSA SERPL-MCNC: 0.5 NG/ML (ref 0.01–4)
RBC # BLD AUTO: 4.99 M/UL (ref 4.1–5.7)
RBC MORPH BLD: ABNORMAL
SODIUM SERPL-SCNC: 140 MMOL/L (ref 136–145)
TRIGL SERPL-MCNC: 103 MG/DL
VLDLC SERPL CALC-MCNC: 20.6 MG/DL
WBC # BLD AUTO: 10.5 K/UL (ref 4.1–11.1)

## 2025-04-14 ENCOUNTER — RESULTS FOLLOW-UP (OUTPATIENT)
Age: 50
End: 2025-04-14

## 2025-05-15 DIAGNOSIS — E11.65 TYPE 2 DIABETES MELLITUS WITH HYPERGLYCEMIA, WITHOUT LONG-TERM CURRENT USE OF INSULIN (HCC): ICD-10-CM

## 2025-05-15 DIAGNOSIS — E78.2 MIXED HYPERLIPIDEMIA: ICD-10-CM

## 2025-05-15 RX ORDER — ROSUVASTATIN CALCIUM 40 MG/1
TABLET, COATED ORAL
Qty: 90 TABLET | Refills: 3 | Status: SHIPPED | OUTPATIENT
Start: 2025-05-15

## 2025-05-15 RX ORDER — GLIPIZIDE 10 MG/1
TABLET, FILM COATED, EXTENDED RELEASE ORAL
Qty: 180 TABLET | Refills: 3 | Status: SHIPPED | OUTPATIENT
Start: 2025-05-15

## 2025-05-15 RX ORDER — METFORMIN HYDROCHLORIDE 500 MG/1
1000 TABLET, EXTENDED RELEASE ORAL 2 TIMES DAILY WITH MEALS
Qty: 360 TABLET | Refills: 3 | Status: SHIPPED | OUTPATIENT
Start: 2025-05-15

## 2025-06-22 DIAGNOSIS — E11.65 TYPE 2 DIABETES MELLITUS WITH HYPERGLYCEMIA, WITHOUT LONG-TERM CURRENT USE OF INSULIN (HCC): ICD-10-CM

## 2025-06-23 RX ORDER — DULAGLUTIDE 1.5 MG/.5ML
INJECTION, SOLUTION SUBCUTANEOUS
Qty: 3 ADJUSTABLE DOSE PRE-FILLED PEN SYRINGE | Refills: 2 | Status: SHIPPED | OUTPATIENT
Start: 2025-06-23

## 2025-07-07 ENCOUNTER — OFFICE VISIT (OUTPATIENT)
Age: 50
End: 2025-07-07
Payer: COMMERCIAL

## 2025-07-07 VITALS
WEIGHT: 251 LBS | TEMPERATURE: 98 F | OXYGEN SATURATION: 96 % | DIASTOLIC BLOOD PRESSURE: 66 MMHG | RESPIRATION RATE: 16 BRPM | HEART RATE: 76 BPM | SYSTOLIC BLOOD PRESSURE: 115 MMHG | BODY MASS INDEX: 35.93 KG/M2 | HEIGHT: 70 IN

## 2025-07-07 DIAGNOSIS — E11.65 TYPE 2 DIABETES MELLITUS WITH HYPERGLYCEMIA, WITHOUT LONG-TERM CURRENT USE OF INSULIN (HCC): Primary | ICD-10-CM

## 2025-07-07 LAB — HBA1C MFR BLD: 7 %

## 2025-07-07 PROCEDURE — 83036 HEMOGLOBIN GLYCOSYLATED A1C: CPT | Performed by: FAMILY MEDICINE

## 2025-07-07 PROCEDURE — 99213 OFFICE O/P EST LOW 20 MIN: CPT | Performed by: FAMILY MEDICINE

## 2025-07-07 PROCEDURE — 3051F HG A1C>EQUAL 7.0%<8.0%: CPT | Performed by: FAMILY MEDICINE

## 2025-07-07 RX ORDER — GLIPIZIDE 10 MG/1
TABLET, FILM COATED, EXTENDED RELEASE ORAL
Qty: 180 TABLET | Refills: 3 | Status: SHIPPED | OUTPATIENT
Start: 2025-07-07

## 2025-07-07 NOTE — PROGRESS NOTES
Chetan Uriarte (:  1975) is a 49 y.o. male,Established patient, here for evaluation of the following chief complaint(s):  Diabetes, Cholesterol Problem, and Follow-up         Assessment & Plan  Type 2 diabetes mellitus with hyperglycemia, without long-term current use of insulin ([facility])       Orders:    AMB POC HEMOGLOBIN A1C    glipiZIDE (GLUCOTROL XL) 10 MG extended release tablet; TAKE 1 TABLET BY MOUTH ONCE A DAY FOR DIABETES  a1c down to 7.0 today. Will increase trulilcity and decrease glipizide 10 mg to once daily    Return in about 4 months (around 2025).       Subjective   HPI [facility] FOR DIABETES CHECK. Has lost 17 lbs on trulicity shots. Wonders about increasing the dose. No complaints of chest pain, shortness of breath, TIAs, claudication or edema.  No hypoglycemic episodes.     Review of Systems       Objective   Physical Exam  Cardiovascular:      Rate and Rhythm: Normal rate and regular rhythm.      Heart sounds: Normal heart sounds. No murmur heard.  Pulmonary:      Effort: Pulmonary effort is normal.      Breath sounds: Normal breath sounds.   Abdominal:      General: Abdomen is flat. Bowel sounds are normal.      Palpations: Abdomen is soft.   Musculoskeletal:      Right lower leg: No edema.      Left lower leg: No edema.                  An electronic signature was used to authenticate this note.    --Eusebio Fonseca MD

## 2025-07-07 NOTE — PROGRESS NOTES
Chief Complaint   Patient presents with    Diabetes    Cholesterol Problem    Follow-up       \"Have you been to the ER, urgent care clinic since your last visit?  Hospitalized since your last visit?\"    NO    “Have you seen or consulted any other health care providers outside of [facility] since your last visit?”    NO      “Have you had a colorectal cancer screening such as a colonoscopy/FIT/Cologuard?    NO    No colonoscopy on file  No cologuard on file  No FIT/FOBT on file   No flexible sigmoidoscopy on file         Click Here for Release of Records Request       Vitals:    25 1510   BP: 115/66   Pulse: 76   Resp: 16   Temp: 98 °F (36.7 °C)   SpO2: 96%      Health Maintenance Due   Topic Date Due    HIV screen  Never done    Diabetic retinal exam  Never done    Hepatitis C screen  Never done    Hepatitis B vaccine (1 of 3 - 19+ 3-dose series) Never done    Pneumococcal 0-49 years Vaccine (1 of 2 - PCV) Never done    Colorectal Cancer Screen  Never done    COVID-19 Vaccine ( season) Never done        The patient, Chetan Uriarte, identity was verified by name and .